# Patient Record
Sex: MALE | Race: WHITE | Employment: FULL TIME | URBAN - METROPOLITAN AREA
[De-identification: names, ages, dates, MRNs, and addresses within clinical notes are randomized per-mention and may not be internally consistent; named-entity substitution may affect disease eponyms.]

---

## 2021-01-24 ENCOUNTER — HOSPITAL ENCOUNTER (EMERGENCY)
Age: 51
Discharge: STILL ADMITTED OR ADMINISTRATIVE | End: 2021-01-24
Payer: COMMERCIAL

## 2021-01-24 ENCOUNTER — HOSPITAL ENCOUNTER (OUTPATIENT)
Age: 51
Discharge: HOME | End: 2021-01-26
Payer: COMMERCIAL

## 2021-01-24 ENCOUNTER — HOSPITAL ENCOUNTER (OUTPATIENT)
Dept: GENERAL RADIOLOGY | Age: 51
Discharge: HOME | End: 2021-01-26
Payer: COMMERCIAL

## 2021-01-24 DIAGNOSIS — M70.61 GREATER TROCHANTERIC BURSITIS, RIGHT: ICD-10-CM

## 2021-01-24 DIAGNOSIS — M25.551 RIGHT HIP PAIN: ICD-10-CM

## 2021-01-24 PROCEDURE — 73502 X-RAY EXAM HIP UNI 2-3 VIEWS: CPT

## 2021-01-29 ENCOUNTER — HOSPITAL ENCOUNTER (OUTPATIENT)
Dept: CT IMAGING | Age: 51
Discharge: HOME | End: 2021-01-31
Payer: COMMERCIAL

## 2021-01-29 DIAGNOSIS — M25.551 RIGHT HIP PAIN: ICD-10-CM

## 2021-01-29 DIAGNOSIS — M16.11 ARTHRITIS OF RIGHT HIP: ICD-10-CM

## 2021-01-29 PROCEDURE — 73700 CT LOWER EXTREMITY W/O DYE: CPT

## 2021-04-19 ASSESSMENT — PROMIS GLOBAL HEALTH SCALE
WHO IS THE PERSON COMPLETING THE PROMIS V1.1 SURVEY?: 0
SUM OF RESPONSES TO QUESTIONS 2, 4, 5, & 10: 13
TO WHAT EXTENT ARE YOU ABLE TO CARRY OUT YOUR EVERYDAY PHYSICAL ACTIVITIES SUCH AS WALKING, CLIMBING STAIRS, CARRYING GROCERIES, OR MOVING A CHAIR [ON A SCALE OF 1 (NOT AT ALL) TO 5 (COMPLETELY)]?: 3
IN GENERAL, HOW WOULD YOU RATE YOUR PHYSICAL HEALTH [ON A SCALE OF 1 (POOR) TO 5 (EXCELLENT)]?: 2
IN GENERAL, HOW WOULD YOU RATE YOUR MENTAL HEALTH, INCLUDING YOUR MOOD AND YOUR ABILITY TO THINK [ON A SCALE OF 1 (POOR) TO 5 (EXCELLENT)]?: 4

## 2021-04-19 ASSESSMENT — HOOS JR
WALKING ON UNEVEN SURFACE: 3
SITTING: 2
LYING IN BED (TURNING OVER, MAINTAINING HIP POSITION): 2
RISING FROM SITTING: 2

## 2021-04-22 PROBLEM — M16.11 PRIMARY OSTEOARTHRITIS OF RIGHT HIP: Status: ACTIVE | Noted: 2021-04-22

## 2021-04-27 NOTE — H&P
[address] [facility]                  [address]                       PREOPERATIVE HISTORY AND PHYSICAL    PATIENT NAME: Shirley Lagos                      :        1970  MED REC NO:   [id number]                            ROOM:  ACCOUNT NO:   [de-identified]                           ADMIT DATE: 2021  PROVIDER:     JADA Barrientos    DATE OF SURGERY:  2021. CHIEF COMPLAINT:  Right hip pain. HISTORY OF PRESENT ILLNESS:  This 77-year-old male with chronic right  hip pain secondary to osteoarthritis. He has failed conservative  treatments with anti-inflammatories, analgesics, and home exercises. He  is now scheduled for right total hip arthroplasty. PAST MEDICAL HISTORY:  Hypertension. PAST SURGICAL HISTORY:  Unremarkable. CURRENT MEDICATIONS:  Advil, buspirone, and benazepril. ALLERGIES:  To PENICILLIN. FAMILY HISTORY:  Diabetes. SOCIAL HISTORY:  Admits to social alcohol consumption and tobacco use. PRIMARY CARE PHYSICIAN:  [facility] .    REVIEW OF SYSTEMS:  ALLERGIES:  As stated above. SKIN AND LYMPHATICS:  Denies rashes or lesions. CNS:  No prior CVAs. RESPIRATORY:  No cough or shortness of breath. CIRCULATORY:  No prior MIs.  DIGESTIVE:  No dyspepsia. GENITOURINARY:  No dysuria. METABOLIC AND ENDOCRINE:  No thyroid disease or diabetes. HEMATOLOGIC:  No anemia. MUSCULOSKELETAL:  Positive OA. PSYCHIATRIC:  Negative. PHYSICAL EXAMINATION:  GENERAL APPEARANCE:  Well-nourished and well-developed 77-year-old male,  in no acute distress. Alert and oriented x3. SKIN:  Without masses, rashes, or lesions. LYMPH NODES:  No adenopathy. HEENT:  Head:  Normocephalic and atraumatic. Ears:  Clear and  functional.  Eyes and fundi:  PERRLA. Nose:  Clear without deviation. Mouth, teeth, and throat:  Clear and functional.  NECK:  Supple. No JVD. CHEST:  Normal excursion.   BREASTS: Deferred. LUNGS:  Clear to auscultation and percussion. HEART:  Regular rate and rhythm. No murmurs, gallops, or rubs  appreciated. ABDOMEN:  Rounded, soft, and nontender. Hernia negative. BACK:  Exam nontender. EXTREMITIES:  Without clubbing, cyanosis, or edema. Exam of right hip:   80 degrees of flexion, 0 degrees of internal rotation or external  rotation, 0 degrees of extension, and 25 degrees of abduction, all with  pain and crepitus. 2/2 pulses. Neurovascular status distally is  intact. NEUROLOGIC:  Cranial nerves II through XII grossly intact. GENITAL:  Exam deferred. RECTAL:  Exam deferred. DIAGNOSTIC IMPRESSION:  OA, right hip. PROCEDURE:  Right total hip arthroplasty.         JADA Gonzalez    D: 04/27/2021 8:19:32       T: 04/27/2021 9:39:58     [email or web address]  Job#: [id number]     Doc#: [id number]    CC:

## 2021-04-30 ENCOUNTER — HOSPITAL ENCOUNTER (OUTPATIENT)
Age: 51
Discharge: HOME | End: 2021-05-02
Payer: COMMERCIAL

## 2021-04-30 ENCOUNTER — HOSPITAL ENCOUNTER (OUTPATIENT)
Dept: PREADMISSION TESTING | Age: 51
Discharge: HOME | End: 2021-04-30
Payer: COMMERCIAL

## 2021-04-30 ENCOUNTER — ANESTHESIA EVENT (OUTPATIENT)
Dept: OPERATING ROOM | Age: 51
End: 2021-04-30
Payer: COMMERCIAL

## 2021-04-30 VITALS
TEMPERATURE: 98.5 F | HEART RATE: 72 BPM | DIASTOLIC BLOOD PRESSURE: 74 MMHG | HEIGHT: 65 IN | RESPIRATION RATE: 18 BRPM | SYSTOLIC BLOOD PRESSURE: 137 MMHG | WEIGHT: 226 LBS | OXYGEN SATURATION: 95 % | BODY MASS INDEX: 37.65 KG/M2

## 2021-04-30 DIAGNOSIS — M16.11 PRIMARY OSTEOARTHRITIS OF RIGHT HIP: ICD-10-CM

## 2021-04-30 DIAGNOSIS — Z01.818 PREOP TESTING: ICD-10-CM

## 2021-04-30 LAB — PREALBUMIN: 27 MG/DL (ref 20–40)

## 2021-04-30 PROCEDURE — U0005 INFEC AGEN DETEC AMPLI PROBE: HCPCS

## 2021-04-30 PROCEDURE — 36415 COLL VENOUS BLD VENIPUNCTURE: CPT

## 2021-04-30 PROCEDURE — 84134 ASSAY OF PREALBUMIN: CPT

## 2021-04-30 PROCEDURE — 87081 CULTURE SCREEN ONLY: CPT

## 2021-04-30 PROCEDURE — U0003 INFECTIOUS AGENT DETECTION BY NUCLEIC ACID (DNA OR RNA); SEVERE ACUTE RESPIRATORY SYNDROME CORONAVIRUS 2 (SARS-COV-2) (CORONAVIRUS DISEASE [COVID-19]), AMPLIFIED PROBE TECHNIQUE, MAKING USE OF HIGH THROUGHPUT TECHNOLOGIES AS DESCRIBED BY CMS-2020-01-R: HCPCS

## 2021-04-30 RX ORDER — BENAZEPRIL HYDROCHLORIDE 40 MG/1
40 TABLET, FILM COATED ORAL DAILY
COMMUNITY
Start: 2021-03-08

## 2021-04-30 RX ORDER — IBUPROFEN 200 MG
400 TABLET ORAL EVERY 6 HOURS PRN
Status: ON HOLD | COMMUNITY
End: 2021-05-06 | Stop reason: HOSPADM

## 2021-04-30 RX ORDER — ACETAMINOPHEN 500 MG
500 TABLET ORAL EVERY 6 HOURS PRN
Status: ON HOLD | COMMUNITY
End: 2021-05-06 | Stop reason: HOSPADM

## 2021-04-30 RX ORDER — BUSPIRONE HYDROCHLORIDE 5 MG/1
5 TABLET ORAL 2 TIMES DAILY
COMMUNITY
Start: 2021-04-01

## 2021-04-30 RX ORDER — HYDROCODONE BITARTRATE AND ACETAMINOPHEN 5; 325 MG/1; MG/1
1 TABLET ORAL EVERY 6 HOURS PRN
Status: ON HOLD | COMMUNITY
Start: 2021-04-19 | End: 2021-05-06 | Stop reason: HOSPADM

## 2021-04-30 ASSESSMENT — PAIN SCALES - GENERAL: PAINLEVEL_OUTOF10: 8

## 2021-04-30 ASSESSMENT — HOOS JR
WALKING ON UNEVEN SURFACE: 3
BENDING TO THE FLOOR TO PICK UP OBJECT: 3
RISING FROM SITTING: 3
GOING UP OR DOWN STAIRS: 2
SITTING: 1

## 2021-04-30 ASSESSMENT — PAIN DESCRIPTION - LOCATION: LOCATION: HIP

## 2021-04-30 ASSESSMENT — PAIN - FUNCTIONAL ASSESSMENT: PAIN_FUNCTIONAL_ASSESSMENT: PREVENTS OR INTERFERES WITH MANY ACTIVE NOT PASSIVE ACTIVITIES

## 2021-04-30 ASSESSMENT — PAIN DESCRIPTION - ORIENTATION: ORIENTATION: RIGHT

## 2021-04-30 ASSESSMENT — PAIN DESCRIPTION - ONSET: ONSET: ON-GOING

## 2021-04-30 NOTE — PROGRESS NOTES
[facility] PRE-ADMISSION TESTING INSTRUCTIONS    Please come to the hospital wearing a mask and have your significant other wear a mask as well. Both of you should check your temperature before leaving to come here,  if it is 100 or higher please call [phone], preop area opens at 0530 a.m.,  for instruction. The Preadmission Testing patient is instructed accordingly using the following criteria (check applicable):    ARRIVAL INSTRUCTIONS:  [x] Parking the day of Surgery is located in the Main Entrance lot. Upon entering the door, make an immediate right to the surgery reception desk    [] Bring photo ID and insurance card    [] Bring in a copy of Living will or Durable Power of  papers.     [x] Please be sure to arrange for responsible adult to provide transportation to and from the hospital    [] Please arrange for responsible adult to be with you for the 24 hour period post procedure due to having anesthesia      GENERAL INSTRUCTIONS:    [x] Nothing by mouth after midnight, including gum, candy, mints or water    [x] You may brush your teeth, but do not swallow any water    [x] Take medications as instructed with 1-2 oz of water    [x] Stop herbal supplements and vitamins 5 days prior to procedure    [x] Follow preop dosing of blood thinners per physician instructions    [] Take 1/2 dose of evening insulin, but no insulin after midnight    [] No oral diabetic medications after midnight    [] If diabetic and have low blood sugar or feel symptomatic, take 1-2oz apple juice only    [] Bring inhalers day of surgery    [x] Bring C-PAP/ Bi-Pap day of surgery    [] Bring urine specimen day of surgery    [] Shower or bath with soap, lather and rinse well, AM of Surgery, no lotion, powders or creams to surgical site    [] Follow bowel prep as instructed per surgeon    [x] No tobacco products within 24 hours of surgery     [x] No alcohol or illegal drug use within 24 hours of surgery.     [x] Jewelry, body piercing's, eyeglasses, contact lenses and dentures are not permitted into surgery (bring cases)      [] Please do not wear any nail polish, make up or hair products on the day of surgery    [x] You can expect a call the business day prior to procedure to notify you if your arrival time changes    [x] If you receive a survey after surgery we would greatly appreciate your comments    [] Parent/guardian of a minor must accompany their child and remain on the premises  the entire time they are under our care     [] Pediatric patients may bring favorite toy, blanket or comfort item with them    [] A caregiver or family member must remain with the patient during their stay if they are mentally handicapped, have dementia, disoriented or unable to use a call light or would be a safety concern if left unattended    [x] Please notify surgeon if you develop any illness between now and time of surgery (cold, cough, sore throat, fever, nausea, vomiting) or any signs of infections  including skin, wounds, and dental.    [x]  The Outpatient Pharmacy is available to fill your prescription here on your day of surgery, ask your preop nurse for details    [] Other instructions    EDUCATIONAL MATERIALS PROVIDED:    [x] PAT Preoperative Education Packet/Booklet     [x] Medication List    [] Transfusion bracelet applied with instructions    [x] Shower with soap, lather and rinse well, and use CHG wipes provided the evening before surgery as instructed    [x] Incentive spirometer with instructions

## 2021-05-01 LAB
MRSA CULTURE ONLY: NORMAL
SARS-COV-2: NOT DETECTED
SOURCE: NORMAL

## 2021-05-05 ENCOUNTER — APPOINTMENT (OUTPATIENT)
Dept: GENERAL RADIOLOGY | Age: 51
End: 2021-05-05
Attending: ORTHOPAEDIC SURGERY
Payer: COMMERCIAL

## 2021-05-05 ENCOUNTER — ANESTHESIA (OUTPATIENT)
Dept: OPERATING ROOM | Age: 51
End: 2021-05-05
Payer: COMMERCIAL

## 2021-05-05 ENCOUNTER — HOSPITAL ENCOUNTER (OUTPATIENT)
Age: 51
Setting detail: OBSERVATION
Discharge: HOME | End: 2021-05-06
Attending: ORTHOPAEDIC SURGERY | Admitting: ORTHOPAEDIC SURGERY
Payer: COMMERCIAL

## 2021-05-05 VITALS — TEMPERATURE: 97.9 F | DIASTOLIC BLOOD PRESSURE: 58 MMHG | SYSTOLIC BLOOD PRESSURE: 105 MMHG | OXYGEN SATURATION: 96 %

## 2021-05-05 DIAGNOSIS — M16.11 PRIMARY OSTEOARTHRITIS OF RIGHT HIP: Primary | ICD-10-CM

## 2021-05-05 DIAGNOSIS — Z01.818 PREOP TESTING: ICD-10-CM

## 2021-05-05 PROCEDURE — G0378 HOSPITAL OBSERVATION PER HR: HCPCS

## 2021-05-05 PROCEDURE — 7100000001 HC PACU RECOVERY - ADDTL 15 MIN: Performed by: ORTHOPAEDIC SURGERY

## 2021-05-05 PROCEDURE — 2500000003 HC RX 250 WO HCPCS: Performed by: ORTHOPAEDIC SURGERY

## 2021-05-05 PROCEDURE — 97161 PT EVAL LOW COMPLEX 20 MIN: CPT

## 2021-05-05 PROCEDURE — 2500000003 HC RX 250 WO HCPCS: Performed by: NURSE ANESTHETIST, CERTIFIED REGISTERED

## 2021-05-05 PROCEDURE — 88304 TISSUE EXAM BY PATHOLOGIST: CPT

## 2021-05-05 PROCEDURE — 2580000003 HC RX 258: Performed by: NURSE ANESTHETIST, CERTIFIED REGISTERED

## 2021-05-05 PROCEDURE — 88311 DECALCIFY TISSUE: CPT

## 2021-05-05 PROCEDURE — 2580000003 HC RX 258: Performed by: PHYSICIAN ASSISTANT

## 2021-05-05 PROCEDURE — 6370000000 HC RX 637 (ALT 250 FOR IP): Performed by: ORTHOPAEDIC SURGERY

## 2021-05-05 PROCEDURE — 97165 OT EVAL LOW COMPLEX 30 MIN: CPT

## 2021-05-05 PROCEDURE — C1776 JOINT DEVICE (IMPLANTABLE): HCPCS | Performed by: ORTHOPAEDIC SURGERY

## 2021-05-05 PROCEDURE — 2500000003 HC RX 250 WO HCPCS: Performed by: PHYSICIAN ASSISTANT

## 2021-05-05 PROCEDURE — 6360000002 HC RX W HCPCS: Performed by: NURSE ANESTHETIST, CERTIFIED REGISTERED

## 2021-05-05 PROCEDURE — 7100000000 HC PACU RECOVERY - FIRST 15 MIN: Performed by: ORTHOPAEDIC SURGERY

## 2021-05-05 PROCEDURE — 6360000002 HC RX W HCPCS: Performed by: ORTHOPAEDIC SURGERY

## 2021-05-05 PROCEDURE — 94660 CPAP INITIATION&MGMT: CPT

## 2021-05-05 PROCEDURE — 3700000001 HC ADD 15 MINUTES (ANESTHESIA): Performed by: ORTHOPAEDIC SURGERY

## 2021-05-05 PROCEDURE — 6370000000 HC RX 637 (ALT 250 FOR IP): Performed by: PHYSICIAN ASSISTANT

## 2021-05-05 PROCEDURE — 73501 X-RAY EXAM HIP UNI 1 VIEW: CPT

## 2021-05-05 PROCEDURE — 2709999900 HC NON-CHARGEABLE SUPPLY: Performed by: ORTHOPAEDIC SURGERY

## 2021-05-05 PROCEDURE — 3600000005 HC SURGERY LEVEL 5 BASE: Performed by: ORTHOPAEDIC SURGERY

## 2021-05-05 PROCEDURE — 2580000003 HC RX 258: Performed by: ORTHOPAEDIC SURGERY

## 2021-05-05 PROCEDURE — 3600000015 HC SURGERY LEVEL 5 ADDTL 15MIN: Performed by: ORTHOPAEDIC SURGERY

## 2021-05-05 PROCEDURE — 3700000000 HC ANESTHESIA ATTENDED CARE: Performed by: ORTHOPAEDIC SURGERY

## 2021-05-05 DEVICE — SHELL ACET SZ D DIA50MM 3 CLUS H TRITANIUM PRESSFIT PRI: Type: IMPLANTABLE DEVICE | Site: HIP | Status: FUNCTIONAL

## 2021-05-05 DEVICE — HEAD FEM DIA32MM +4MM OFFSET BIOLOX DELT CERAMIC V40 TAPR: Type: IMPLANTABLE DEVICE | Site: HIP | Status: FUNCTIONAL

## 2021-05-05 DEVICE — LINER ACET SZ D OD50X52MM ID32MM THK5.9MM 0DEG HIP X3: Type: IMPLANTABLE DEVICE | Site: HIP | Status: FUNCTIONAL

## 2021-05-05 DEVICE — STEM FEM SZ 4 L105MM NK L35MM 38MM OFFSET 132DEG HIP TI: Type: IMPLANTABLE DEVICE | Site: HIP | Status: FUNCTIONAL

## 2021-05-05 RX ORDER — SODIUM CHLORIDE 9 MG/ML
25 INJECTION, SOLUTION INTRAVENOUS PRN
Status: DISCONTINUED | OUTPATIENT
Start: 2021-05-05 | End: 2021-05-06 | Stop reason: HOSPADM

## 2021-05-05 RX ORDER — SODIUM CHLORIDE 0.9 % (FLUSH) 0.9 %
10 SYRINGE (ML) INJECTION PRN
Status: DISCONTINUED | OUTPATIENT
Start: 2021-05-05 | End: 2021-05-06 | Stop reason: HOSPADM

## 2021-05-05 RX ORDER — SODIUM CHLORIDE 0.9 % (FLUSH) 0.9 %
5-40 SYRINGE (ML) INJECTION EVERY 12 HOURS SCHEDULED
Status: DISCONTINUED | OUTPATIENT
Start: 2021-05-05 | End: 2021-05-05 | Stop reason: HOSPADM

## 2021-05-05 RX ORDER — EPHEDRINE SULFATE/0.9% NACL/PF 50 MG/5 ML
SYRINGE (ML) INTRAVENOUS PRN
Status: DISCONTINUED | OUTPATIENT
Start: 2021-05-05 | End: 2021-05-05 | Stop reason: SDUPTHER

## 2021-05-05 RX ORDER — ACETAMINOPHEN 500 MG
1000 TABLET ORAL EVERY 8 HOURS SCHEDULED
Status: DISCONTINUED | OUTPATIENT
Start: 2021-05-05 | End: 2021-05-06 | Stop reason: HOSPADM

## 2021-05-05 RX ORDER — MIDAZOLAM HYDROCHLORIDE 1 MG/ML
INJECTION INTRAMUSCULAR; INTRAVENOUS PRN
Status: DISCONTINUED | OUTPATIENT
Start: 2021-05-05 | End: 2021-05-05 | Stop reason: SDUPTHER

## 2021-05-05 RX ORDER — MEPERIDINE HYDROCHLORIDE 25 MG/ML
12.5 INJECTION INTRAMUSCULAR; INTRAVENOUS; SUBCUTANEOUS EVERY 5 MIN PRN
Status: DISCONTINUED | OUTPATIENT
Start: 2021-05-05 | End: 2021-05-05 | Stop reason: HOSPADM

## 2021-05-05 RX ORDER — PROPOFOL 10 MG/ML
INJECTION, EMULSION INTRAVENOUS PRN
Status: DISCONTINUED | OUTPATIENT
Start: 2021-05-05 | End: 2021-05-05 | Stop reason: SDUPTHER

## 2021-05-05 RX ORDER — CLINDAMYCIN PHOSPHATE 900 MG/50ML
900 INJECTION INTRAVENOUS
Status: COMPLETED | OUTPATIENT
Start: 2021-05-05 | End: 2021-05-05

## 2021-05-05 RX ORDER — DEXAMETHASONE SODIUM PHOSPHATE 10 MG/ML
10 INJECTION INTRAMUSCULAR; INTRAVENOUS ONCE
Status: COMPLETED | OUTPATIENT
Start: 2021-05-06 | End: 2021-05-06

## 2021-05-05 RX ORDER — SODIUM CHLORIDE 9 MG/ML
INJECTION, SOLUTION INTRAVENOUS CONTINUOUS
Status: DISCONTINUED | OUTPATIENT
Start: 2021-05-05 | End: 2021-05-06 | Stop reason: HOSPADM

## 2021-05-05 RX ORDER — SENNA AND DOCUSATE SODIUM 50; 8.6 MG/1; MG/1
1 TABLET, FILM COATED ORAL 2 TIMES DAILY
Status: DISCONTINUED | OUTPATIENT
Start: 2021-05-05 | End: 2021-05-06 | Stop reason: HOSPADM

## 2021-05-05 RX ORDER — PROMETHAZINE HYDROCHLORIDE 25 MG/1
12.5 TABLET ORAL EVERY 6 HOURS PRN
Status: DISCONTINUED | OUTPATIENT
Start: 2021-05-05 | End: 2021-05-06 | Stop reason: HOSPADM

## 2021-05-05 RX ORDER — CELECOXIB 100 MG/1
200 CAPSULE ORAL DAILY
Status: DISCONTINUED | OUTPATIENT
Start: 2021-05-06 | End: 2021-05-06

## 2021-05-05 RX ORDER — SODIUM CHLORIDE 9 MG/ML
INJECTION, SOLUTION INTRAVENOUS CONTINUOUS
Status: DISCONTINUED | OUTPATIENT
Start: 2021-05-05 | End: 2021-05-05

## 2021-05-05 RX ORDER — SODIUM CHLORIDE 9 MG/ML
INJECTION, SOLUTION INTRAVENOUS CONTINUOUS PRN
Status: DISCONTINUED | OUTPATIENT
Start: 2021-05-05 | End: 2021-05-05 | Stop reason: SDUPTHER

## 2021-05-05 RX ORDER — OXYCODONE HYDROCHLORIDE 5 MG/1
5 TABLET ORAL EVERY 4 HOURS PRN
Status: DISCONTINUED | OUTPATIENT
Start: 2021-05-05 | End: 2021-05-06 | Stop reason: HOSPADM

## 2021-05-05 RX ORDER — ONDANSETRON 2 MG/ML
INJECTION INTRAMUSCULAR; INTRAVENOUS PRN
Status: DISCONTINUED | OUTPATIENT
Start: 2021-05-05 | End: 2021-05-05 | Stop reason: SDUPTHER

## 2021-05-05 RX ORDER — SODIUM CHLORIDE 0.9 % (FLUSH) 0.9 %
5-40 SYRINGE (ML) INJECTION PRN
Status: DISCONTINUED | OUTPATIENT
Start: 2021-05-05 | End: 2021-05-05 | Stop reason: HOSPADM

## 2021-05-05 RX ORDER — PHENYLEPHRINE HYDROCHLORIDE 10 MG/ML
INJECTION INTRAVENOUS PRN
Status: DISCONTINUED | OUTPATIENT
Start: 2021-05-05 | End: 2021-05-05 | Stop reason: SDUPTHER

## 2021-05-05 RX ORDER — DEXAMETHASONE SODIUM PHOSPHATE 4 MG/ML
INJECTION, SOLUTION INTRA-ARTICULAR; INTRALESIONAL; INTRAMUSCULAR; INTRAVENOUS; SOFT TISSUE PRN
Status: DISCONTINUED | OUTPATIENT
Start: 2021-05-05 | End: 2021-05-05 | Stop reason: SDUPTHER

## 2021-05-05 RX ORDER — FENTANYL CITRATE 50 UG/ML
INJECTION, SOLUTION INTRAMUSCULAR; INTRAVENOUS PRN
Status: DISCONTINUED | OUTPATIENT
Start: 2021-05-05 | End: 2021-05-05 | Stop reason: SDUPTHER

## 2021-05-05 RX ORDER — GABAPENTIN 300 MG/1
600 CAPSULE ORAL ONCE
Status: COMPLETED | OUTPATIENT
Start: 2021-05-05 | End: 2021-05-05

## 2021-05-05 RX ORDER — LIDOCAINE HYDROCHLORIDE 20 MG/ML
INJECTION, SOLUTION EPIDURAL; INFILTRATION; INTRACAUDAL; PERINEURAL PRN
Status: DISCONTINUED | OUTPATIENT
Start: 2021-05-05 | End: 2021-05-05 | Stop reason: SDUPTHER

## 2021-05-05 RX ORDER — CELECOXIB 100 MG/1
200 CAPSULE ORAL ONCE
Status: COMPLETED | OUTPATIENT
Start: 2021-05-05 | End: 2021-05-05

## 2021-05-05 RX ORDER — GABAPENTIN 300 MG/1
300 CAPSULE ORAL NIGHTLY
Status: DISCONTINUED | OUTPATIENT
Start: 2021-05-05 | End: 2021-05-06 | Stop reason: HOSPADM

## 2021-05-05 RX ORDER — ACETAMINOPHEN 500 MG
1000 TABLET ORAL ONCE
Status: COMPLETED | OUTPATIENT
Start: 2021-05-05 | End: 2021-05-05

## 2021-05-05 RX ORDER — PROPOFOL 10 MG/ML
INJECTION, EMULSION INTRAVENOUS CONTINUOUS PRN
Status: DISCONTINUED | OUTPATIENT
Start: 2021-05-05 | End: 2021-05-05 | Stop reason: SDUPTHER

## 2021-05-05 RX ORDER — DEXAMETHASONE SODIUM PHOSPHATE 10 MG/ML
8 INJECTION, SOLUTION INTRAMUSCULAR; INTRAVENOUS ONCE
Status: DISCONTINUED | OUTPATIENT
Start: 2021-05-05 | End: 2021-05-05 | Stop reason: HOSPADM

## 2021-05-05 RX ORDER — ONDANSETRON 2 MG/ML
4 INJECTION INTRAMUSCULAR; INTRAVENOUS EVERY 6 HOURS PRN
Status: DISCONTINUED | OUTPATIENT
Start: 2021-05-05 | End: 2021-05-06 | Stop reason: HOSPADM

## 2021-05-05 RX ORDER — OXYCODONE HYDROCHLORIDE 5 MG/1
10 TABLET ORAL EVERY 4 HOURS PRN
Status: DISCONTINUED | OUTPATIENT
Start: 2021-05-05 | End: 2021-05-06 | Stop reason: HOSPADM

## 2021-05-05 RX ORDER — SODIUM CHLORIDE 9 MG/ML
25 INJECTION, SOLUTION INTRAVENOUS PRN
Status: DISCONTINUED | OUTPATIENT
Start: 2021-05-05 | End: 2021-05-05 | Stop reason: HOSPADM

## 2021-05-05 RX ORDER — SODIUM CHLORIDE 0.9 % (FLUSH) 0.9 %
10 SYRINGE (ML) INJECTION EVERY 12 HOURS SCHEDULED
Status: DISCONTINUED | OUTPATIENT
Start: 2021-05-05 | End: 2021-05-06 | Stop reason: HOSPADM

## 2021-05-05 RX ORDER — ONDANSETRON 2 MG/ML
4 INJECTION INTRAMUSCULAR; INTRAVENOUS
Status: DISCONTINUED | OUTPATIENT
Start: 2021-05-05 | End: 2021-05-05 | Stop reason: HOSPADM

## 2021-05-05 RX ORDER — CLINDAMYCIN PHOSPHATE 900 MG/50ML
900 INJECTION INTRAVENOUS EVERY 8 HOURS
Status: COMPLETED | OUTPATIENT
Start: 2021-05-05 | End: 2021-05-06

## 2021-05-05 RX ADMIN — ASPIRIN 325 MG: 325 TABLET, COATED ORAL at 12:13

## 2021-05-05 RX ADMIN — OXYCODONE HYDROCHLORIDE 5 MG: 5 TABLET ORAL at 23:22

## 2021-05-05 RX ADMIN — LIDOCAINE HYDROCHLORIDE 60 MG: 20 INJECTION, SOLUTION EPIDURAL; INFILTRATION; INTRACAUDAL; PERINEURAL at 07:14

## 2021-05-05 RX ADMIN — DEXAMETHASONE SODIUM PHOSPHATE 12 MG: 4 INJECTION, SOLUTION INTRAMUSCULAR; INTRAVENOUS at 07:14

## 2021-05-05 RX ADMIN — MIDAZOLAM 2 MG: 1 INJECTION INTRAMUSCULAR; INTRAVENOUS at 07:10

## 2021-05-05 RX ADMIN — PHENYLEPHRINE HYDROCHLORIDE 100 MCG: 10 INJECTION INTRAVENOUS at 08:34

## 2021-05-05 RX ADMIN — CLINDAMYCIN PHOSPHATE 900 MG: 900 INJECTION, SOLUTION INTRAVENOUS at 15:00

## 2021-05-05 RX ADMIN — ACETAMINOPHEN 500 MG: 500 TABLET ORAL at 06:45

## 2021-05-05 RX ADMIN — Medication 10 MG: at 08:16

## 2021-05-05 RX ADMIN — SODIUM CHLORIDE: 9 INJECTION, SOLUTION INTRAVENOUS at 06:30

## 2021-05-05 RX ADMIN — ONDANSETRON 4 MG: 2 INJECTION INTRAMUSCULAR; INTRAVENOUS at 07:18

## 2021-05-05 RX ADMIN — SODIUM CHLORIDE: 9 INJECTION, SOLUTION INTRAVENOUS at 06:00

## 2021-05-05 RX ADMIN — FENTANYL CITRATE 50 MCG: 50 INJECTION, SOLUTION INTRAMUSCULAR; INTRAVENOUS at 07:10

## 2021-05-05 RX ADMIN — PROPOFOL 50 MG: 10 INJECTION, EMULSION INTRAVENOUS at 07:14

## 2021-05-05 RX ADMIN — SODIUM CHLORIDE: 9 INJECTION, SOLUTION INTRAVENOUS at 07:58

## 2021-05-05 RX ADMIN — SODIUM CHLORIDE: 9 INJECTION, SOLUTION INTRAVENOUS at 11:46

## 2021-05-05 RX ADMIN — DOCUSATE SODIUM 50 MG AND SENNOSIDES 8.6 MG 1 TABLET: 8.6; 5 TABLET, FILM COATED ORAL at 21:35

## 2021-05-05 RX ADMIN — CLINDAMYCIN PHOSPHATE 900 MG: 900 INJECTION, SOLUTION INTRAVENOUS at 23:11

## 2021-05-05 RX ADMIN — Medication 10 MG: at 08:52

## 2021-05-05 RX ADMIN — TRANEXAMIC ACID 1 G: 1 INJECTION, SOLUTION INTRAVENOUS at 07:20

## 2021-05-05 RX ADMIN — CLINDAMYCIN IN 5 PERCENT DEXTROSE 900 MG: 18 INJECTION, SOLUTION INTRAVENOUS at 07:00

## 2021-05-05 RX ADMIN — ACETAMINOPHEN 1000 MG: 500 TABLET ORAL at 21:35

## 2021-05-05 RX ADMIN — GABAPENTIN 600 MG: 300 CAPSULE ORAL at 06:47

## 2021-05-05 RX ADMIN — CELECOXIB 200 MG: 100 CAPSULE ORAL at 06:46

## 2021-05-05 RX ADMIN — PROPOFOL 50 MCG/KG/MIN: 10 INJECTION, EMULSION INTRAVENOUS at 07:18

## 2021-05-05 RX ADMIN — TRANEXAMIC ACID 1000 MG: 1 INJECTION, SOLUTION INTRAVENOUS at 10:30

## 2021-05-05 RX ADMIN — ACETAMINOPHEN 1000 MG: 500 TABLET ORAL at 12:16

## 2021-05-05 RX ADMIN — Medication 10 MG: at 07:30

## 2021-05-05 RX ADMIN — GABAPENTIN 300 MG: 300 CAPSULE ORAL at 21:35

## 2021-05-05 RX ADMIN — OXYCODONE HYDROCHLORIDE 5 MG: 5 TABLET ORAL at 15:39

## 2021-05-05 RX ADMIN — FENTANYL CITRATE 25 MCG: 50 INJECTION, SOLUTION INTRAMUSCULAR; INTRAVENOUS at 07:30

## 2021-05-05 RX ADMIN — DOCUSATE SODIUM 50 MG AND SENNOSIDES 8.6 MG 1 TABLET: 8.6; 5 TABLET, FILM COATED ORAL at 12:15

## 2021-05-05 RX ADMIN — Medication 20 MG: at 08:34

## 2021-05-05 RX ADMIN — PHENYLEPHRINE HYDROCHLORIDE 100 MCG: 10 INJECTION INTRAVENOUS at 07:25

## 2021-05-05 RX ADMIN — PHENYLEPHRINE HYDROCHLORIDE 100 MCG: 10 INJECTION INTRAVENOUS at 07:20

## 2021-05-05 ASSESSMENT — PULMONARY FUNCTION TESTS
PIF_VALUE: 1
PIF_VALUE: 0
PIF_VALUE: 1
PIF_VALUE: 0
PIF_VALUE: 1
PIF_VALUE: 0
PIF_VALUE: 1
PIF_VALUE: 0
PIF_VALUE: 1

## 2021-05-05 ASSESSMENT — PAIN SCALES - GENERAL
PAINLEVEL_OUTOF10: 5
PAINLEVEL_OUTOF10: 0
PAINLEVEL_OUTOF10: 1
PAINLEVEL_OUTOF10: 5

## 2021-05-05 ASSESSMENT — PAIN - FUNCTIONAL ASSESSMENT: PAIN_FUNCTIONAL_ASSESSMENT: 0-10

## 2021-05-05 ASSESSMENT — LIFESTYLE VARIABLES: SMOKING_STATUS: 1

## 2021-05-05 NOTE — PROGRESS NOTES
Occupational Therapy  OCCUPATIONAL THERAPY INITIAL EVALUATION      Date:2021  Patient Name: Gita Thornton  MRN: [id number]  : 1970  Room: [address] [place], [place] [address]    Referring Provider: Neetu Wilkerson MD    Evaluating OT: Robertota Bamberger OTR/L [id number]    AM-PAC Daily Activity Raw Score: 1724    Recommended Adaptive Equipment: TBD    Diagnosis: R hip OA   Surgery: 21 R LINDSEY   Pertinent Medical History: HTN, arthritis, anxiety   Precautions:  Falls, FWBAT R LE, posterolateral hip precautions     Home Living: Pt lives alone in a 2 story home with B/B 1st floor, put a bed in living room initially for return home. Bathroom setup: walk in shower with seat and grab bar, standard commode     Prior Level of Function: Independent with ADLs, Independent with IADLs; completed functional mobility with no AD. Has [employer]. Driving: Yes. Works as a . Pain Level: mild pain R LE    Cognition: A&O: . Problem solving:  WFL   Judgement/safety:  WFL     Functional Assessment:   Initial Eval Status  Date: 21 Treatment session:  Short Term Goals     Feeding Independent     Grooming Set up  Independent   UB Dressing Set up  Independent   LB Dressing Max A  Mod I    Bathing Mod A  Mod I   Toileting Min A  Mod I   Bed Mobility  Supine to sit: Min A     Functional Transfers STS: Min A  Mod I   Functional Mobility Min A with [employer]  Household distance  1 LOB with Mod A for recovery, mild numbness present in B LEs  Mod I during ADLs   Balance Sitting: fair plus    Standing: fair minus at [employer]     Activity Tolerance Fair minus  standing bridgette x6-7 min with fair plus balance during self care tasks           ADL: Pt educated on hip precautions in relation to self care tasks with mod cues throughout for follow through. Treatment: Patient educated on techniques for completion of ADL, safe functional transfers and functional mobility.   Patient required cues for follow through with proper hand/foot placement, pacing, safety, compensatory strategies, breathing and technique in bed mobility, functional transfers, functional mobility and LB dressing in preparation for maximum independence in all self care tasks.      Hand Dominance: Right []  Left []   Strength ROM Additional Info:    RUE  4/5 WFL good  and FMC/dexterity noted during ADL tasks     LUE 4/5 WFL good  and FMC/dexterity noted during ADL tasks         Hearing: WFL   Vision: WFL   Sensation:  Mild numbness B LEs  Tone: WFL   Edema: none                             Long Term Goal (1-3 wks): Pt will maximize functional performance in all self care tasks/functional transfers with good follow through of all trained techniques for safe transition to next level of care    Assessment of current deficits   Functional mobility [x]  ADLs [x] Strength [x]  Cognition []  Functional transfers  [x] IADLs [x] Safety Awareness [x]  Endurance [x]  Fine Motor Coordination [] Balance [x] Vision/perception [] Sensation []   Gross Motor Coordination [] ROM [] Delirium []                  Motor Control []    Plan of Care: 2-5 days/week for 1-2 weeks PRN   [x]ADL retraining/adaptive techniques and AE recommendations to increase functional independence within precautions                    [x]Energy conservation techniques to improve tolerance for ADL/IADLs  [x]Functional transfer/mobility training/DME recommendations for increased independence, safety and fall prevention         [x]Patient/family education to increase safety and functional independence during daily routine          [x]Environmental modifications for safe mobility and completion of ADLs                             []Cognitive retraining to improve problem solving skills & safe participation in ADLs/IADLs     []Sensory re-education techniques to improve extremity awareness, maintain skin integrity and improve hand function                             []Visual/Perceptual retraining to improve body awareness and safety during transfers and ADLs  []Splinting/positioning needs to maintain joint/skin integrity and contracture prevention  [x]Therapeutic activity to improve functional performance during ADLs                                        [x]Therapeutic exercise to improve tolerance and functional strength for ADLs   [x]Balance retraining/tolerance tasks for facilitation of postural control with dynamic challenges during ADLs  []Neuromuscular re-education to facilitate righting/equilibrium reactions, midline orientation, scapular stability/mobility, normalize muscle tone and facilitate active functional movement                        []Delirium prevention/treatment    [x]Positioning to improve functional independence and decrease risk of skin breakdown  []Other:     Rehab Potential: Good for established goals     Patient/Family Goal: To get home. Patient and/or family were instructed on functional diagnosis, prognosis/goals and OT plan of care. Pt verbalized understanding. Upon arrival, patient supine in bed. At end of session, patient seated in hip chair with call light and phone within reach, all lines and tubes intact. Pt would benefit from continued skilled OT to increase safety and independence with completion of ADL/IADL tasks for functional independence and quality of life.      Low Evaluation 11643  Time In: 1300   Time Out: 1317      Evaluation time includes thorough review of current medical information, gathering information on past medical history/social history and prior level of function, completion of standardized testing/informal observation of tasks, assessment of data, and development of POC/Goals    Leroyvaishali Zazueta OTR/L  [id number]

## 2021-05-05 NOTE — PLAN OF CARE
Problem: Infection:  Goal: Will remain free from infection  Description: Will remain free from infection  5/5/2021 1413 by Lynne Rubio RN  Outcome: Ongoing  5/5/2021 1220 by Lynne Rubio RN  Outcome: Ongoing     Problem: Safety:  Goal: Free from accidental physical injury  Description: Free from accidental physical injury  5/5/2021 1413 by Lynne Rubio RN  Outcome: Ongoing  5/5/2021 1220 by Lynne Rubio RN  Outcome: Ongoing  Goal: Free from intentional harm  Description: Free from intentional harm  5/5/2021 1413 by Lynne Rubio RN  Outcome: Ongoing  5/5/2021 1220 by Lynne Rubio RN  Outcome: Ongoing     Problem: Daily Care:  Goal: Daily care needs are met  Description: Daily care needs are met  5/5/2021 1413 by Lynne Rubio RN  Outcome: Ongoing  5/5/2021 1220 by Lynne Rubio RN  Outcome: Ongoing     Problem: Pain:  Goal: Patient's pain/discomfort is manageable  Description: Patient's pain/discomfort is manageable  5/5/2021 1413 by Lynne Rubio RN  Outcome: Ongoing  5/5/2021 1220 by Lynne Rubio RN  Outcome: Ongoing     Problem: Skin Integrity:  Goal: Skin integrity will stabilize  Description: Skin integrity will stabilize  5/5/2021 1413 by Lynne Rubio RN  Outcome: Ongoing  5/5/2021 1220 by Lynne Rubio RN  Outcome: Ongoing     Problem: Discharge Planning:  Goal: Patients continuum of care needs are met  Description: Patients continuum of care needs are met  5/5/2021 1413 by Lynne Rubio RN  Outcome: Ongoing  5/5/2021 1220 by Lynne Rubio RN  Outcome: Ongoing  Goal: Knowledge of discharge instructions  Description: Knowledge of discharge instructions  5/5/2021 1413 by Lynne Rubio RN  Outcome: Ongoing  5/5/2021 1220 by Lynne Rubio RN  Outcome: Ongoing     Problem: Infection - Surgical Site:  Goal: Signs of wound healing will improve  Description: Signs of wound healing will improve  5/5/2021 1413 by Lynne Rubio RN  Outcome: Ongoing  5/5/2021 1220 by Antonia Amaral BROOK Fernández  Outcome: Ongoing     Problem: Mobility - Impaired:  Goal: Achieve maximum mobility level  Description: Achieve maximum mobility level  5/5/2021 1413 by Author Sohan RN  Outcome: Ongoing  5/5/2021 1220 by Author Sohan RN  Outcome: Ongoing     Problem: Pain - Acute:  Goal: Pain level will decrease  Description: Pain level will decrease  5/5/2021 1413 by Author Sohan RN  Outcome: Ongoing  5/5/2021 1220 by Author Sohan RN  Outcome: Ongoing

## 2021-05-05 NOTE — CARE COORDINATION
Met with patient about diagnosis and discharge plan of care. Met with pt in ortho class. Confirmed that he has wheeled walker, elevated toilet, grabs bars at home. No other DME needed. [facility] home care following and orders completed.  Will follow-MJO

## 2021-05-05 NOTE — ANESTHESIA POSTPROCEDURE EVALUATION
Department of Anesthesiology  Postprocedure Note    Patient: Juan Carlos Rojas  MRN: [id number]  YOB: 1970  Date of evaluation: 5/5/2021  Time:  3:40 PM     Procedure Summary     Date: 05/05/21 Room / Location: [facility] OR 04 / [facility]    Anesthesia Start: 0700 Anesthesia Stop: 7034    Procedure: RIGHT TOTAL HIP ARTHROPLASTY (Right Hip) Diagnosis: (OSTEOARTHRITIS)    Surgeons: Priscilla Mejia MD Responsible Provider: Beverly Yee MD    Anesthesia Type: spinal ASA Status: 3          Anesthesia Type: spinal    Carley Phase I: Carley Score: 10    Carley Phase II:      Last vitals: Reviewed and per EMR flowsheets.        Anesthesia Post Evaluation    Patient location during evaluation: PACU  Patient participation: complete - patient participated  Level of consciousness: awake and alert  Airway patency: patent  Nausea & Vomiting: no vomiting and no nausea  Complications: no  Cardiovascular status: hemodynamically stable  Respiratory status: acceptable  Hydration status: stable

## 2021-05-05 NOTE — PROGRESS NOTES
Physical Therapy    Facility/Department: [facility] SURGERY  Initial Assessment    NAME: Paulina Bass  : 1970  MRN: [id number]    Date of Service: 2021       REQUIRES PT FOLLOW UP: Yes       Patient Diagnosis(es): The primary encounter diagnosis was Primary osteoarthritis of right hip. A diagnosis of Preop testing was also pertinent to this visit. has a past medical history of Anxiety, Arthritis, H/O tooth extraction, Hypertension, AUDREY on CPAP, Right hip pain, and Wears glasses. has a past surgical history that includes fracture surgery (Right) and Total hip arthroplasty (Right, 2021). Evaluating Therapist: Rah Love, PT     Referring Provider:  Dr. Odilia Herrera #:  [id number]   DIAGNOSIS:  OA s/p R LINDSEY 2021   PRECAUTIONS: falls, posterolateral hip precautions, FWBAT     Social:  Pt lives alone  in a  1   floor plan  3  steps and  1  rails to enter. Prior to admission pt walked with no AD . Has ww      Initial Evaluation  Date: 2021  Treatment      Short Term/ Long Term   Goals   Was pt agreeable to Eval/treatment? yes      Does pt have pain?  minimal R hip soreness      Bed Mobility  Rolling:  NT   Supine to sit:  Min assist   Sit to supine:  NT   Scooting: SBA    SBA    Transfers Sit to stand:  Min assist   Stand to sit: CGA   Stand pivot:  NT    SBA    Ambulation    70  feet with  ww  with  Min assist    200  feet with ww  with SBA        Stair negotiation: ascended and descended NT    4  steps with  1  rail with  AAD SBA    LE ROM  decreased throughout R  hip, distally WFL      LE strength  3-/ 5 R hip, 4-/ 5 distally      AM- PAC RAW score   17/ 24            Pt is alert and Oriented x  3      Balance:  Min assist, 1 LOB with gait, fall risk   Sensation:  Mildly decreased from block     Endurance: WFL   Bed/Chair alarm:  no     ASSESSMENT  Pt displays functional ability as noted in the objective portion of this evaluation.           Treatment/Education:     Mobility as above. Instructed  in hip precautions prior to mobility. Cues for technique with bed mobility, hand and foot placement with transfers, and ww safety. Mildly ataxic gait due to decreased sensation; slow maritza     Pt educated on fall risk,  LE exercises, safe and proper technique with mobility        Patient response to education:   Pt verbalized understanding Pt demonstrated skill Pt requires further education in this area   x  with cues   x       Comments:  Pt left  In hip chair after session, with call light in reach. Rehab potential is Good for reaching above PT goals. Pts/ family goals   1. Home     Patient and or family understand(s) diagnosis, prognosis, and plan of care. -  Yes     PLAN  PT care will be provided in accordance with the objectives noted above. Whenever appropriate, clear delegation orders will be provided for nursing staff. Exercises and functional mobility practice will be used as well as appropriate assistive devices or modalities to obtain goals. Patient and family education will also be administered as needed. PLAN OF CARE:    Current Treatment Recommendations     [x] Strengthening     [] ROM   [x] Balance Training   [x] Endurance Training   [x] Transfer Training   [x] Gait Training   [x] Stair Training   [x] Positioning   [x] Safety and Education Training   [x] Patient/Caregiver Education   [x] HEP  [] Other       Frequency of treatments will be BID x 2 -3 days. Time in: 1300  Time out:  1320       Evaluation Time includes thorough review of current medical information, gathering information on past medical history/social history and prior level of function, completion of standardized testing/informal observation of tasks, assessment of data and education on plan of care and goals.     CPT codes:  [x] Low Complexity PT evaluation 89238  [] Moderate Complexity PT evaluation 42934  [] High Complexity PT evaluation 88507  [] PT Re-evaluation [id number]  [] Gait training 59631 minutes  [] Therapeutic activities 31133  minutes  [] Therapeutic exercises 65211  minutes  [] Neuromuscular reeducation 76348  minutes       Jesus 18 number:  PT 6963

## 2021-05-05 NOTE — ANESTHESIA PRE PROCEDURE
Department of Anesthesiology  Preprocedure Note       Name:  Luisa Kramer   Age:  48 y.o.  :  1970                                          MRN:  [id number]         Date:  2021      Surgeon: Tess Drummond):  Angelica Novoa MD    Procedure: Procedure(s):  RIGHT TOTAL HIP ARTHROPLASTY   +++RAKESH+++    Medications prior to admission:   Prior to Admission medications    Medication Sig Start Date End Date Taking? Authorizing Provider   benazepril (LOTENSIN) 40 MG tablet Take 40 mg by mouth daily  3/8/21  Yes Historical Provider, MD   busPIRone (BUSPAR) 5 MG tablet Take 5 mg by mouth 2 times daily  21  Yes Historical Provider, MD   HYDROcodone-acetaminophen (NORCO) 5-325 MG per tablet Take 1 tablet by mouth every 6 hours as needed.   21  Yes Historical Provider, MD   acetaminophen (TYLENOL) 500 MG tablet Take 500 mg by mouth every 6 hours as needed for Pain   Yes Historical Provider, MD   ibuprofen (ADVIL;MOTRIN) 200 MG tablet Take 400 mg by mouth every 6 hours as needed for Pain   Yes Historical Provider, MD   Multiple Vitamins-Minerals (ONE-A-DAY MENS 50+ PO) Take 1 tablet by mouth daily LD 21   Yes Historical Provider, MD       Current medications:    Current Facility-Administered Medications   Medication Dose Route Frequency Provider Last Rate Last Admin    acetaminophen (TYLENOL) tablet 1,000 mg  1,000 mg Oral Once Dirk Jumper, PA        gabapentin (NEURONTIN) capsule 600 mg  600 mg Oral Once Dirk Jumper, PA        celecoxib (CELEBREX) capsule 200 mg  200 mg Oral Once Dirk Jumper, PA        dexamethasone (PF) (DECADRON) injection 8 mg  8 mg Intravenous Once Dirk Jumper, PA        sodium chloride flush 0.9 % injection 5-40 mL  5-40 mL Intravenous 2 times per day Dirk Jumper, PA        sodium chloride flush 0.9 % injection 5-40 mL  5-40 mL Intravenous PRN Dirk Jumper, PA        0.9 % sodium chloride infusion  25 mL Intravenous PRN Dirk Jumper, PA        0.9 % sodium chloride infusion   Intravenous Continuous JADA Carlos        clindamycin (CLEOCIN) 900 mg in dextrose 5 % 50 mL IVPB  900 mg Intravenous On Call to [address], [place]        tranexamic acid (CYKLOKAPRON) 1,000 mg in dextrose 5 % 100 mL IVPB  1,000 mg Intravenous On Call to [address], [place]           Allergies: Allergies   Allergen Reactions    Penicillins Swelling     generalized       Problem List:    Patient Active Problem List   Diagnosis Code    Primary osteoarthritis of right hip M16.11       Past Medical History:        Diagnosis Date    Anxiety     Arthritis     H/O tooth extraction 03/17/2021    multiple     Hypertension     AUDREY on CPAP     Right hip [facility] 04/2021    for [facility] 05/[facility]    Wears glasses        Past Surgical History:        Procedure Laterality Date    FRACTURE SURGERY Right     ankle, plate and screws       Social History:    Social History     Tobacco Use    Smoking status: Current Every Day Smoker     Packs/day: 1.50     Years: 25.00     Pack years: 37.50     Types: Cigarettes    Smokeless tobacco: Never Used   Substance Use Topics    Alcohol use: Yes     Comment: socially beer                                Ready to quit: Not Answered  Counseling given: Not Answered      Vital Signs (Current): There were no vitals filed for this visit.                                            BP Readings from Last 3 Encounters:   04/30/21 137/74       NPO Status: Time of last liquid consumption: 2300                        Time of last solid consumption: 1800                        Date of last liquid consumption: 05/04/21                        Date of last solid food consumption: 05/04/21    BMI:   Wt Readings from Last 3 Encounters:   04/30/21 226 lb (102.5 kg)     There is no height or weight on file to calculate BMI.    CBC: No results found for: WBC, RBC, HGB, HCT, MCV, RDW, PLT    CMP: No results found for: NA, K, CL, CO2, BUN, CREATININE, GFRAA, AGRATIO,

## 2021-05-05 NOTE — OP NOTE
Operative Note      Patient: Gerson Aparicio  YOB: 1970  MRN: [id number]    Date of Procedure: 5/5/2021    Pre-Op Diagnosis: OSTEOARTHRITIS Right Hip    Post-Op Diagnosis: Same       Procedure(s):  RIGHT TOTAL HIP ARTHROPLASTY    Surgeon(s):  Deedee Brunner, MD    Assistant:   Brittni Grimm. Ramesh Sherman PA-C    Anesthesia: Spinal    Estimated Blood Loss (mL): 713 ml    Complications: None    Specimens:   ID Type Source Tests Collected by Time Destination   A : RIGHT HIP BONE Bone Bone SURGICAL PATHOLOGY Deedee Brunner, MD 5/5/2021 0800        Implants:  Implant Name Type Inv. Item Serial No.  Lot No. LRB No. Used Action   SHELL ACET SZ D [id number] CLUS H TRITANIUM PRESSFIT JALYN  SHELL ACET SZ D [id number] CLUS H TRITANIUM PRESSFIT JALYN  [employer] [facility] [id number] Right 1 Implanted   STEM FEM SZ 4 L105MM NK L35MM 38MM OFFSET 132DEG HIP TI  STEM FEM SZ 4 L105MM NK L35MM 38MM OFFSET 132DEG HIP TI  [employer] [facility] [id number] Right 1 Implanted   LINER ACET SZ D [id number] ID32MM THK5. 9MM 0DEG HIP X3  LINER ACET SZ D [id number] ID32MM THK5. 9MM 0DEG HIP X3  [employer] [facility] [id number] Right 1 Implanted   HEAD FEM [id number] +4MM OFFSET BIOLOX DELT CERAMIC V40 TAPR  HEAD FEM [id number] +4MM OFFSET BIOLOX DELT CERAMIC V40 TAPR  [employer] [facility] [id number] Right 1 Implanted         Drains: * No LDAs found *    Findings: OA    Detailed Description of Procedure:       HISTORY:  This patient is a 48 y.o.  male  with progressive pain in the right hip. X-rays and evaluation revealed significant osteoarthritis of the right hip, bone-on-bone in the weightbearing aspect, with significant peripheral osteophytes. The patient had tried or considered all conservative options applicable, including  anti-inflammatories, weight loss and exercise, without success. Having failed conservative options, it was felt the patient would benefit from a total hip arthroplasty.   The patient was cleared medically, proximal filling. We applied our trial 132 degree neck and a 32+0 head and reduced the hip. There was soft tissue laxity, so we went up to a 32 + 4 mm head trial.  This gave us excellent soft tissue tension. We had excellent stability with flexion to 90 degrees and internal rotation to about 90 degrees. We were also stable in extension and external rotation, as well as mid flexion, adduction, and internal rotation. We therefore, chose this combination of implants. We removed all the trials. We inserted the actual X-3 poly size D, 32 mm for the 50 mm Trident II Tritanium cup. We then implanted the actual Stefany Accolade 2 femoral component, size 4, 132 degree neck which was fully seated with good purchase. We then did a final trial reduction with a 32+4 mm head with excellent stability and soft tissue tension as previously described. We applied the actual femoral head component and reduced the hip. Then we copiously irrigated the wound. We closed the capsule posteriorly with #1 Ethibond figure of eight suture. We then repaired the short external rotators back to the posterior margins of the greater trochanter with drill holes through bone. We closed the tensor fascia with #1 Stratofix running suture. We closed the subcu layer with 2-0 Vicryl inverted suture, and the skin with a running 3-0 Monocryl subcuticular stitch. Steri-strips were applied to the skin. Sterile dressing was applied with Aquacel. The patient was placed in an abductor pillow and turned supine on the hospital bed. The patient was retrieved from anesthesia and taken to recovery room in good condition, having tolerated the procedure well. All needle, sponge and instrument counts were correct. SUMMARY OF IMPLANTS:  We used Stefany implants with 50 mm Trident II Tritanium acetabular component with size D, 32 mm X-3 polyethylene insert.   We used [place] Accolade 2 femoral component size 4, 132 degree neck with a MightyQuiz ceramic 32 + 4 mm head. SPECIMEN:  Femoral head      Jung Chandler PA-C, was present for the entire procedure, assisting in   positioning & draping, each step of the procedure, and wound closure.       Suzy Allen           Electronically signed by Suzy Allen MD on 5/5/2021 at 8:40 AM

## 2021-05-06 VITALS
SYSTOLIC BLOOD PRESSURE: 131 MMHG | WEIGHT: 227.2 LBS | TEMPERATURE: 98.3 F | DIASTOLIC BLOOD PRESSURE: 76 MMHG | RESPIRATION RATE: 16 BRPM | OXYGEN SATURATION: 95 % | HEIGHT: 65 IN | HEART RATE: 72 BPM | BODY MASS INDEX: 37.85 KG/M2

## 2021-05-06 PROBLEM — E66.01 CLASS 2 SEVERE OBESITY WITH SERIOUS COMORBIDITY IN ADULT (HCC): Status: ACTIVE | Noted: 2021-05-06

## 2021-05-06 PROBLEM — R73.9 ELEVATED BLOOD SUGAR: Status: ACTIVE | Noted: 2021-05-06

## 2021-05-06 LAB
ALBUMIN SERPL-MCNC: 3.4 G/DL (ref 3.5–5.2)
ALP BLD-CCNC: 42 U/L (ref 40–129)
ALT SERPL-CCNC: 20 U/L (ref 0–40)
ANION GAP SERPL CALCULATED.3IONS-SCNC: 9 MMOL/L (ref 7–16)
AST SERPL-CCNC: 28 U/L (ref 0–39)
BASOPHILS ABSOLUTE: 0.01 E9/L (ref 0–0.2)
BASOPHILS RELATIVE PERCENT: 0.1 % (ref 0–2)
BILIRUB SERPL-MCNC: 0.2 MG/DL (ref 0–1.2)
BUN BLDV-MCNC: 16 MG/DL (ref 6–20)
CALCIUM SERPL-MCNC: 8.1 MG/DL (ref 8.6–10.2)
CHLORIDE BLD-SCNC: 106 MMOL/L (ref 98–107)
CO2: 19 MMOL/L (ref 22–29)
CREAT SERPL-MCNC: 0.7 MG/DL (ref 0.7–1.2)
EOSINOPHILS ABSOLUTE: 0 E9/L (ref 0.05–0.5)
EOSINOPHILS RELATIVE PERCENT: 0 % (ref 0–6)
GFR AFRICAN AMERICAN: >60
GFR NON-AFRICAN AMERICAN: >60 ML/MIN/1.73
GLUCOSE BLD-MCNC: 143 MG/DL (ref 74–99)
HCT VFR BLD CALC: 34.8 % (ref 37–54)
HCT VFR BLD CALC: 34.9 % (ref 37–54)
HEMOGLOBIN: 11.9 G/DL (ref 12.5–16.5)
HEMOGLOBIN: 12 G/DL (ref 12.5–16.5)
IMMATURE GRANULOCYTES #: 0.1 E9/L
IMMATURE GRANULOCYTES %: 0.7 % (ref 0–5)
LYMPHOCYTES ABSOLUTE: 1.1 E9/L (ref 1.5–4)
LYMPHOCYTES RELATIVE PERCENT: 7.3 % (ref 20–42)
MCH RBC QN AUTO: 31.7 PG (ref 26–35)
MCHC RBC AUTO-ENTMCNC: 34.4 % (ref 32–34.5)
MCV RBC AUTO: 92.1 FL (ref 80–99.9)
MONOCYTES ABSOLUTE: 1.33 E9/L (ref 0.1–0.95)
MONOCYTES RELATIVE PERCENT: 8.8 % (ref 2–12)
NEUTROPHILS ABSOLUTE: 12.61 E9/L (ref 1.8–7.3)
NEUTROPHILS RELATIVE PERCENT: 83.1 % (ref 43–80)
PDW BLD-RTO: 11.8 FL (ref 11.5–15)
PLATELET # BLD: 232 E9/L (ref 130–450)
PMV BLD AUTO: 9.7 FL (ref 7–12)
POTASSIUM SERPL-SCNC: 4.1 MMOL/L (ref 3.5–5)
RBC # BLD: 3.79 E12/L (ref 3.8–5.8)
SODIUM BLD-SCNC: 134 MMOL/L (ref 132–146)
TOTAL PROTEIN: 5.3 G/DL (ref 6.4–8.3)
WBC # BLD: 15.2 E9/L (ref 4.5–11.5)

## 2021-05-06 PROCEDURE — 97530 THERAPEUTIC ACTIVITIES: CPT

## 2021-05-06 PROCEDURE — 2580000003 HC RX 258: Performed by: ORTHOPAEDIC SURGERY

## 2021-05-06 PROCEDURE — 85025 COMPLETE CBC W/AUTO DIFF WBC: CPT

## 2021-05-06 PROCEDURE — 96374 THER/PROPH/DIAG INJ IV PUSH: CPT

## 2021-05-06 PROCEDURE — 94660 CPAP INITIATION&MGMT: CPT

## 2021-05-06 PROCEDURE — 6360000002 HC RX W HCPCS: Performed by: ORTHOPAEDIC SURGERY

## 2021-05-06 PROCEDURE — 85014 HEMATOCRIT: CPT

## 2021-05-06 PROCEDURE — 36415 COLL VENOUS BLD VENIPUNCTURE: CPT

## 2021-05-06 PROCEDURE — 80053 COMPREHEN METABOLIC PANEL: CPT

## 2021-05-06 PROCEDURE — G0378 HOSPITAL OBSERVATION PER HR: HCPCS

## 2021-05-06 PROCEDURE — 85018 HEMOGLOBIN: CPT

## 2021-05-06 PROCEDURE — 6370000000 HC RX 637 (ALT 250 FOR IP): Performed by: ORTHOPAEDIC SURGERY

## 2021-05-06 PROCEDURE — 97535 SELF CARE MNGMENT TRAINING: CPT

## 2021-05-06 RX ORDER — OXYCODONE HYDROCHLORIDE 5 MG/1
5-10 TABLET ORAL EVERY 4 HOURS PRN
Qty: 70 TABLET | Refills: 0 | Status: SHIPPED | OUTPATIENT
Start: 2021-05-06 | End: 2021-05-13

## 2021-05-06 RX ORDER — INDOMETHACIN 75 MG/1
75 CAPSULE, EXTENDED RELEASE ORAL
Status: DISCONTINUED | OUTPATIENT
Start: 2021-05-06 | End: 2021-05-06 | Stop reason: HOSPADM

## 2021-05-06 RX ORDER — INDOMETHACIN 75 MG/1
75 CAPSULE, EXTENDED RELEASE ORAL
Qty: 60 CAPSULE | Refills: 3 | Status: SHIPPED | OUTPATIENT
Start: 2021-05-06 | End: 2021-05-27

## 2021-05-06 RX ORDER — ACETAMINOPHEN 500 MG
1000 TABLET ORAL EVERY 8 HOURS
Qty: 180 TABLET | Refills: 0 | Status: SHIPPED | OUTPATIENT
Start: 2021-05-06

## 2021-05-06 RX ORDER — ASPIRIN 325 MG
325 TABLET, DELAYED RELEASE (ENTERIC COATED) ORAL DAILY
Qty: 28 TABLET | Refills: 0 | Status: SHIPPED | OUTPATIENT
Start: 2021-05-06 | End: 2021-06-03

## 2021-05-06 RX ADMIN — ACETAMINOPHEN 1000 MG: 500 TABLET ORAL at 06:41

## 2021-05-06 RX ADMIN — ASPIRIN 325 MG: 325 TABLET, COATED ORAL at 10:08

## 2021-05-06 RX ADMIN — OXYCODONE HYDROCHLORIDE 10 MG: 5 TABLET ORAL at 14:27

## 2021-05-06 RX ADMIN — INDOMETHACIN 75 MG: 75 CAPSULE, EXTENDED RELEASE ORAL at 09:00

## 2021-05-06 RX ADMIN — DEXAMETHASONE SODIUM PHOSPHATE 10 MG: 10 INJECTION INTRAMUSCULAR; INTRAVENOUS at 06:41

## 2021-05-06 RX ADMIN — DOCUSATE SODIUM 50 MG AND SENNOSIDES 8.6 MG 1 TABLET: 8.6; 5 TABLET, FILM COATED ORAL at 10:08

## 2021-05-06 RX ADMIN — SODIUM CHLORIDE, PRESERVATIVE FREE 10 ML: 5 INJECTION INTRAVENOUS at 10:10

## 2021-05-06 RX ADMIN — OXYCODONE HYDROCHLORIDE 5 MG: 5 TABLET ORAL at 06:41

## 2021-05-06 ASSESSMENT — PAIN SCALES - GENERAL
PAINLEVEL_OUTOF10: 2
PAINLEVEL_OUTOF10: 5

## 2021-05-06 NOTE — PROGRESS NOTES
Physical Therapy    Facility/Department: [address] ORTHO SURGERY  Treatment note    NAME: Mireille Alvarado  : 1970  MRN: [id number]    Date of Service: 2021               Patient Diagnosis(es): The primary encounter diagnosis was Primary osteoarthritis of right hip. A diagnosis of Preop testing was also pertinent to this visit. has a past medical history of Anxiety, Arthritis, H/O tooth extraction, Hypertension, AUDREY on CPAP, Right hip pain, and Wears glasses. has a past surgical history that includes fracture surgery (Right) and Total hip arthroplasty (Right, 2021). Evaluating Therapist: Chaitanya Plasencia, PT     Referring Provider:  Dr. Aura Rudd #:  [id number]   DIAGNOSIS:  OA s/p R LINDSEY 2021   PRECAUTIONS: falls, posterolateral hip precautions, FWBAT     Social:  Pt lives alone  in a  1   floor plan  3  steps and  1  rails to enter. Prior to admission pt walked with no AD . Has ww      Initial Evaluation  Date: 2021  Treatment  21    Short Term/ Long Term   Goals   Was pt agreeable to Eval/treatment?   yes  yes    Does pt have pain?  minimal R hip soreness  R hip sorenss    Bed Mobility  Rolling:  NT   Supine to sit:  Min assist   Sit to supine:  NT   Scooting: SBA  NT  SBA    Transfers Sit to stand:  Min assist   Stand to sit: CGA   Stand pivot:  NT  Sit <> stand: SBA  SBA    Ambulation    70  feet with  ww  with  Min assist   220 feet and 120 feet x 1 each using WW for support SBA for balance 200  feet with ww  with SBA        Stair negotiation: ascended and descended NT  4 stairs with B rails, 4 stairs with rail/standard cane for support SBA, step to pattern used  4  steps with  1  rail with  AAD SBA    LE ROM  decreased throughout R  hip, distally WFL      LE strength  3-/ 5 R hip, 4-/ 5 distally      AM- PAC RAW score             Pt is alert and able to follow instruction  Balance: fair dynamic using [employer] for support    Pt performed therapeutic exercise of the following:

## 2021-05-06 NOTE — PROGRESS NOTES
Physical Therapy    Facility/Department: [address] ORTHO SURGERY  Treatment note    NAME: Francesco Pitts  : 1970  MRN: [id number]    Date of Service: 2021               Patient Diagnosis(es): The primary encounter diagnosis was Primary osteoarthritis of right hip. A diagnosis of Preop testing was also pertinent to this visit. has a past medical history of Anxiety, Arthritis, H/O tooth extraction, Hypertension, AUDREY on CPAP, Right hip pain, and Wears glasses. has a past surgical history that includes fracture surgery (Right) and Total hip arthroplasty (Right, 2021). Evaluating Therapist: Brenden Dale PT     Referring Provider:  Dr. Jain Pro #:  [id number]   DIAGNOSIS:  OA s/p R LINDSEY 2021   PRECAUTIONS: falls, posterolateral hip precautions, FWBAT     Social:  Pt lives alone  in a  1   floor plan  3  steps and  1  rails to enter. Prior to admission pt walked with no AD . Has ww      Initial Evaluation  Date: 2021  Treatment  21 PM   Short Term/ Long Term   Goals   Was pt agreeable to Eval/treatment?   yes  yes    Does pt have pain?  minimal R hip soreness  R hip sorenss    Bed Mobility  Rolling:  NT   Supine to sit:  Min assist   Sit to supine:  NT   Scooting: SBA  Supine<> Sit SBA  SBA    Transfers Sit to stand:  Min assist   Stand to sit: CGA   Stand pivot:  NT  Sit <> stand: SBA  SBA    Ambulation    70  feet with  ww  with  Min assist   390 feet and 120 feet x 1 each using WW for support SBA for balance 200  feet with ww  with SBA        Stair negotiation: ascended and descended NT  4 stairs with rail/standard cane for support SBA, step to pattern used  4  steps with  1  rail with  AAD SBA    LE ROM  decreased throughout R  hip, distally WFL      LE strength  3-/ 5 R hip, 4-/ 5 distally      AM- PAC RAW score             Pt is alert and able to follow instruction  Balance: fair dynamic using [employer] for support    Pt performed therapeutic exercise of the following: seated ankle pumps, glut sets x 20 AROM    Patient education  Pt was educated on exercise promoting circulation and strengthening, UE usage to assist with transfers, gait promoting posture, sequence, staying within [employer] base of support, stair negotiation promoting sequence and cane placement, hip precautions    Patient response to education:   Pt verbalized understanding Pt demonstrated skill Pt requires further education in this area   yes With instruction yes     ASSESSMENT:   Comments: Pt found in a bedside chair, agreed to Rx. Gait to the hallway, maritza slow and consistent, step through reciprocal pattern achieved after instruction, no loss of balance or shortness of breath noted. Stairs performed with minimal difficulty. Once back to the room, car transfer discussed, bed mobility performed using sheet for support. Girlfriend present all rx. Pt states she will stay with him initially at home. Pt and Girlfriend states has no further questions about home safety. Treatment: Pt practiced and was instructed in the following treatment: exercise, gait mechanics, [employer] safety, stairs, hip precautions, bed mobility, car transfer    Pt was left in a bedside hip chair as found with call light in reach    Time in 1312  Time out 1337   Total Treatment Time 25 minutes   CPT codes:     Therapeutic activities 97338 25 minutes   Therapeutic exercises 23366 0 minutes       Pt is making good progress toward established Physical Therapy goals as per increased functional mobility performed. Continue with physical therapy current plan of care.     Elicia Akers PTA   License Number: [id number]

## 2021-05-06 NOTE — PLAN OF CARE
Problem: Infection:  Goal: Will remain free from infection  Description: Will remain free from infection  Outcome: Met This Shift     Problem: Safety:  Goal: Free from accidental physical injury  Description: Free from accidental physical injury  Outcome: Met This Shift  Goal: Free from intentional harm  Description: Free from intentional harm  Outcome: Met This Shift     Problem: Daily Care:  Goal: Daily care needs are met  Description: Daily care needs are met  Outcome: Met This Shift     Problem: Pain:  Goal: Patient's pain/discomfort is manageable  Description: Patient's pain/discomfort is manageable  Outcome: Met This Shift     Problem: Skin Integrity:  Goal: Skin integrity will stabilize  Description: Skin integrity will stabilize  Outcome: Met This Shift     Problem: Discharge Planning:  Goal: Patients continuum of care needs are met  Description: Patients continuum of care needs are met  5/6/2021 0957 by Eldonna Cushing, RN  Outcome: Met This Shift  5/5/2021 2235 by Drew Hamilton RN  Outcome: Ongoing  Goal: Knowledge of discharge instructions  Description: Knowledge of discharge instructions  5/6/2021 0957 by Eldonna Cushing, RN  Outcome: Met This Shift  5/5/2021 2235 by Drew Hamilton RN  Outcome: Ongoing     Problem: Infection - Surgical Site:  Goal: Signs of wound healing will improve  Description: Signs of wound healing will improve  5/6/2021 0957 by Eldonna Cushing, RN  Outcome: Met This Shift  5/5/2021 2235 by Drew Hamilton RN  Outcome: Ongoing     Problem: Mobility - Impaired:  Goal: Achieve maximum mobility level  Description: Achieve maximum mobility level  Outcome: Met This Shift     Problem: Pain - Acute:  Goal: Pain level will decrease  Description: Pain level will decrease  5/6/2021 0957 by Eldonna Cushing, RN  Outcome: Met This Shift  5/5/2021 2235 by Drew Hamilton RN  Outcome: Ongoing     Problem: Falls - Risk of:  Goal: Will remain free from falls  Description: Will remain free from falls  Outcome: Met This Shift  Goal: Absence of physical injury  Description: Absence of physical injury  Outcome: Met This Shift

## 2021-05-06 NOTE — PROGRESS NOTES
Occupational Therapy  OT BEDSIDE TREATMENT NOTE      Date:2021  Patient Name: Jerica Huitron  MRN: [id number]  : 1970  Room: [address] [place] [address]     Referring Provider: Paulina Mooney MD     Evaluating OT: Jeffy Benton OTR/L [id number]     AM-PAC Daily Activity Raw Score:      Recommended Adaptive Equipment: elevated toilet seat     Diagnosis: R hip OA   Surgery: 21 R LINDSEY   Pertinent Medical History: HTN, arthritis, anxiety   Precautions:  Falls, FWBAT R LE, posterolateral hip precautions     Home Living: Pt lives alone in a 2 story home with B/B 1st floor, put a bed in living room initially for return home. Bathroom setup: walk in shower with seat and grab bar, standard commode      Prior Level of Function: Independent with ADLs, Independent with IADLs; completed functional mobility with no AD. Has [address] Tone.     Pain Level: pt did not report level of pain.      Cognition: Awake and alert. Cues for safety awareness. Functional Assessment:    Initial Eval Status  Date: 21 Treatment session:  Short Term Goals      Feeding Independent       Grooming Set up Independent   Independent   UB Dressing Set up  setup  Independent   LB Dressing Max A  pt instructed with posterolateral hip precautions. Pt with limited carry over of restrictions. Limited comprehension of importance of not bending past 90 degrees for ADL process. Pt instructed with adaptive equipment use and hip kit issued to pt this session. Mod I    Bathing Mod A Bathe sponge issued. Walk in shower transfer SBA after instruction of technique.    Mod I   Toileting Min A Pt states he has elevated toilet seat at home. Recommended use of elevated seat due to hip precautions.   Instructed not to sit on low surfaces.   Mod I   Bed Mobility  Supine to sit: Min A       Functional Transfers STS: Min A Independent.   Mod I   Functional Mobility Min A with [address] Tone  Household distance  1 LOB with Mod A for recovery, mild numbness present in B LEs Supervision using w/w   Mod I during ADLs   Balance Sitting: fair plus     Standing: fair minus at [address] Tone       Activity Tolerance Fair minus Fair +   standing bridgette x6-7 min with fair plus balance during self care tasks       Comments:  Pt very concerned about getting back to work. Questionable understanding of importance of hip precautions during ADL. Pt provided with AE. No further questions regarding AE use or dressing technique. Pt remained in chair at end of the session. Education/treatment:  ADL retraining with facilitation of movement, instruction of adaptive equipment, and education of posterolateral hip precautions for self care skills. Therapeutic activity to address balance and endurance for ADL and transfers. Pt education of transfer safety, walker safety, home safety, hip precautions, and adaptive equipment use. · Pt has made  progress towards set goals. Plan of Care: 2-5 days/week for 1-2 weeks PRN   [x]? ?ADL retraining/adaptive techniques and AE recommendations to increase functional independence within precautions                    [x]? ? Energy conservation techniques to improve tolerance for ADL/IADLs  [x]? ? Functional transfer/mobility training/DME recommendations for increased independence, safety and fall prevention         [x]? ?Patient/family education to increase safety and functional independence during daily routine          [x]? ? Environmental modifications for safe mobility and completion of ADLs                             []? ? Cognitive retraining to improve problem solving skills & safe participation in ADLs/IADLs     []? ?Sensory re-education techniques to improve extremity awareness, maintain skin integrity and improve hand function                             []? ? Visual/Perceptual retraining to improve body awareness and safety during transfers and ADLs  []? ? Splinting/positioning needs to maintain joint/skin integrity and contracture prevention  [x]? ? Therapeutic activity to improve functional performance during ADLs                                        [x]? ? Therapeutic exercise to improve tolerance and functional strength for ADLs   [x]? ? Balance retraining/tolerance tasks for facilitation of postural control with dynamic challenges during ADLs  []? ?Neuromuscular re-education to facilitate righting/equilibrium reactions, midline orientation, scapular stability/mobility, normalize muscle tone and facilitate active functional movement                        []? ? Delirium prevention/treatment    [x]? Positioning to improve functional independence and decrease risk of skin breakdown  []? ?Other:     Time In: 9:12  Time Out: 9:40      Min Units   Therapeutic Ex 39801     Therapeutic Activities 19211 78 1   ADL/Self Care 49342 28 2   Orthotic Management 05315     Neuro Re-Ed 46939     Non-Billable Time     TOTAL TIMED TREATMENT 38 1239 [facility] CLARA/L [id number]

## 2021-05-06 NOTE — CARE COORDINATION
Updated discharge plan of care. POD#2. Discharge home today with no needs.  [facility] notified-dmitry

## 2021-05-06 NOTE — CONSULTS
[address] [facility]  Initial Consult Note      REASON FOR CONSULTATION: Medical management    ATTENDING/ADMITTING PHYSICIAN: Dr. Violet Ramires:      The patient is a 48 y.o. male patient of Dr. Owen Cap history of hypertension, AUDREY, obesity who presents with right hip OA status post right LINDSEY 5/5. Pt denies uncontrolled pain. No chest pain, trouble breathing, vomiting, or diarrhea. No fevers or chills. Patient states  chronic medical problems are well controlled. Patient denies history of diabetes. He does acknowledge that he has significant weight problems. He states that he hopes to increase his activity after knee replacement. He has sleep apnea and uses CPAP regularly        Past Medical History:   Diagnosis Date    Anxiety     Arthritis     H/O tooth extraction 03/17/2021    multiple     Hypertension     AUDREY on CPAP     Right hip [facility] 04/2021    [facility]    Wears glasses            Past Surgical History:   Procedure Laterality Date    FRACTURE SURGERY Right     ankle, plate and screws    TOTAL HIP ARTHROPLASTY Right 5/5/2021    RIGHT TOTAL HIP ARTHROPLASTY performed by Kevin Freed MD at [facility] OR       Medications Prior to Admission:    Medications Prior to Admission: benazepril (LOTENSIN) 40 MG tablet, Take 40 mg by mouth daily   busPIRone (BUSPAR) 5 MG tablet, Take 5 mg by mouth 2 times daily   Multiple Vitamins-Minerals (ONE-A-DAY MENS 50+ PO), Take 1 tablet by mouth daily LD 04/30/21  [DISCONTINUED] HYDROcodone-acetaminophen (NORCO) 5-325 MG per tablet, Take 1 tablet by mouth every 6 hours as needed.    [DISCONTINUED] acetaminophen (TYLENOL) 500 MG tablet, Take 500 mg by mouth every 6 hours as needed for Pain  [DISCONTINUED] ibuprofen (ADVIL;MOTRIN) 200 MG tablet, Take 400 mg by mouth every 6 hours as needed for Pain    Note that the patient's home medications were reviewed and the above list is accurate to the best of my knowledge at the time of the exam.      Allergies:    Penicillins    Social History:    reports that he has been smoking cigarettes. He has a 37.50 pack-year smoking history. He has never used smokeless tobacco. He reports current alcohol use. He reports previous drug use. Family History:   pt denies contributory history     REVIEW OF SYSTEMS:  As above in the HPI, otherwise negative    PHYSICAL EXAM:    Vitals:  /77   Pulse 82   Temp 98 °F (36.7 °C) (Oral)   Resp 16   Ht 5' 5\" (1.651 m)   Wt 227 lb 3.2 oz (103.1 kg)   SpO2 95%   BMI 37.81 kg/m²     General:  Awake, alert, oriented X 3. Well developed, well nourished, well groomed. No apparent distress. HEENT:  Normocephalic, atraumatic. Pupils equal, round, reactive to light. No scleral icterus. No conjunctival injection. Normal lips, teeth, and gums. No nasal discharge. Neck:  Supple  Heart:  RRR, no murmurs, gallops, rubs  Lungs:  CTA bilaterally, bilat symmetrical expansion, no wheeze, rales, or rhonchi  Abdomen:   Bowel sounds present, soft, nontender, no masses, no organomegaly, no peritoneal signs  Extremities: Incision CDI no clubbing, cyanosis, or edema  Skin:  Warm and dry, no open lesions or rash  Neuro:  Cranial nerves 2-12 intact, no focal deficits  Breast: deferred  Rectal: deferred  Genitalia:  deferred    LABS:    CBC with Differential:    Lab Results   Component Value Date    WBC 15.2 05/06/2021    RBC 3.79 05/06/2021    HGB 12.0 05/06/2021    HGB 11.9 05/06/2021    HCT 34.9 05/06/2021    HCT 34.8 05/06/2021     05/06/2021    MCV 92.1 05/06/2021    MCH 31.7 05/06/2021    MCHC 34.4 05/06/2021    RDW 11.8 05/06/2021    LYMPHOPCT 7.3 05/06/2021    MONOPCT 8.8 05/06/2021    BASOPCT 0.1 05/06/2021    MONOSABS 1.33 05/06/2021    LYMPHSABS 1.10 05/06/2021    EOSABS 0.00 05/06/2021    BASOSABS 0.01 05/06/2021     CMP:    Lab Results   Component Value Date     05/06/2021    K 4.1 05/06/2021     05/06/2021    CO2 19 05/06/2021 BUN 16 05/06/2021    CREATININE 0.7 05/06/2021    GFRAA >60 05/06/2021    LABGLOM >60 05/06/2021    GLUCOSE 143 05/06/2021    PROT 5.3 05/06/2021    LABALBU 3.4 05/06/2021    CALCIUM 8.1 05/06/2021    BILITOT 0.2 05/06/2021    ALKPHOS 42 05/06/2021    AST 28 05/06/2021    ALT 20 05/06/2021     Magnesium:  No results found for: MG  Phosphorus:  No results found for: PHOS  PT/INR:  No results found for: PROTIME, INR  U/A:  No results found for: NITRITE, COLORU, PROTEINU, PHUR, LABCAST, WBCUA, RBCUA, MUCUS, TRICHOMONAS, YEAST, BACTERIA, CLARITYU, SPECGRAV, LEUKOCYTESUR, UROBILINOGEN, BILIRUBINUR, BLOODU, GLUCOSEU, AMORPHOUS  HgBA1c:  No results found for: LABA1C  FLP:  No results found for: TRIG, HDL, LDLCALC, LDLDIRECT, LABVLDL  TSH:  No results found for: TSH    ASSESSMENT:      Principal Problem:    Primary osteoarthritis of right hip  Active Problems:    Hypertension    AUDREY on CPAP    Class 2 severe obesity with serious comorbidity in adult ([address]    Elevated blood sugar  Resolved Problems:    * No resolved hospital problems. *      PLAN:    55-year-old obese male status post right LINDSEY    Perioperative care per primary service  Pain control  Bowel regimen  PT/OT  Elevated blood sugar, labs this morning-may be secondary to steroids received yesterday  Recommend follow-up with PCP regarding blood sugar after discharge  Blood pressure controlled-home ACE inhibitor held   Continue nocturnal NIPPV  Counselled weight loss through healthy diet and increased activity   medications for other co morbidities cont as appropriate w dosage adjustments as necessary     Thank you for allowing me to participate in the care of this patient. Okay for discharge from my perspective. Recommend close follow-up with PCP regarding chronic health issues.         Wilfred Beach MD  8:00 AM  5/6/2021

## 2021-05-06 NOTE — PROGRESS NOTES
Total Joint    Post op Day  1      S:  Complaints: none    O:  Afebrile, Vital signs stable     Dressing Intact   Full range of motion right ankle and toes   Sensation intact to light touch     H/H:   Recent Labs     05/06/21  0413   HGB 11.9*  12.0*   HCT 34.8*  34.9*        PT/INR:   Recent Labs     05/06/21  0413   PROT 5.3*                   Xray:  stable implants    A/P:  Stable   Acute post-op blood loss anemia-stable   Proceed with Physical Therapy                            Heplock IV's if PO intake is adequate              Start Indocin to prevent heterotopic ossification   Evaluate for Home Discharge. Can go today. Home health care needed secondary to unsteady gait, walker for ambulation,                      and need for home physical therapy.

## 2021-05-06 NOTE — PLAN OF CARE
Problem: Discharge Planning:  Goal: Patients continuum of care needs are met  Description: Patients continuum of care needs are met  5/5/2021 2235 by Krystal Block RN  Outcome: Ongoing     Problem: Discharge Planning:  Goal: Knowledge of discharge instructions  Description: Knowledge of discharge instructions  5/5/2021 2235 by Krystal Block RN  Outcome: Ongoing     Problem: Infection - Surgical Site:  Goal: Signs of wound healing will improve  Description: Signs of wound healing will improve  5/5/2021 2235 by Krystal Block RN  Outcome: Ongoing     Problem: Pain - Acute:  Goal: Pain level will decrease  Description: Pain level will decrease  5/5/2021 2235 by Krystal Block RN  Outcome: Ongoing

## 2021-11-17 ENCOUNTER — HOSPITAL ENCOUNTER (OUTPATIENT)
Age: 51
Discharge: HOME | End: 2021-11-19
Payer: COMMERCIAL

## 2021-11-17 ENCOUNTER — HOSPITAL ENCOUNTER (OUTPATIENT)
Dept: GENERAL RADIOLOGY | Age: 51
Discharge: HOME | End: 2021-11-19
Payer: COMMERCIAL

## 2021-11-17 DIAGNOSIS — M25.552 LEFT HIP PAIN: ICD-10-CM

## 2021-11-17 PROCEDURE — 73502 X-RAY EXAM HIP UNI 2-3 VIEWS: CPT

## 2022-07-19 PROBLEM — M16.12 PRIMARY OSTEOARTHRITIS OF LEFT HIP: Status: ACTIVE | Noted: 2022-07-19

## 2022-07-21 NOTE — H&P
[address] [facility]                  [address]                       PREOPERATIVE HISTORY AND PHYSICAL    PATIENT NAME: Savi Hayes                    :        1970  MED REC NO:   [id number]                            ROOM:  ACCOUNT NO:   [de-identified]                           ADMIT DATE: 2022  PROVIDER:     Danilo Guo PA-C    DATE OF SERVICE:  2022. Dictating for Burtis Nyhan, M.D.    CHIEF COMPLAINT:  Left hip pain. HISTORY OF PRESENT ILLNESS:  This is a 26-year-old male with chronic  left hip pain secondary to osteoarthritis. He has failed conservative  treatments with anti-inflammatories, analgesics, and home exercises. He  is now scheduled for left total hip arthroplasty. PAST MEDICAL HISTORY:  Depression and hypertension. PAST SURGICAL HISTORY:  Right total hip arthroplasty. CURRENT MEDICATIONS:  Ibuprofen, Norco, Advil, buspirone, and  benazepril. ALLERGIES:  To PENICILLIN. FAMILY HISTORY:  Unremarkable. SOCIAL HISTORY:  Admits to tobacco use and social alcohol consumption. PRIMARY CARE PROVIDER:  Papa Woodall DO    REVIEW OF SYSTEMS:  ALLERGIES:  As stated above. SKIN AND LYMPHATICS:  Denies rashes or lesions. CNS:  No prior CVAs. RESPIRATORY:  No cough or shortness of breath. CIRCULATORY:  No prior MIs.  DIGESTIVE:  No dyspepsia. GENITOURINARY:  No dysuria. METABOLIC AND ENDOCRINE:  No thyroid disease or diabetes. HEMATOLOGIC:  No anemia. MUSCULOSKELETAL:  Positive OA. PSYCHIATRIC:  History of depression. PHYSICAL EXAMINATION:  GENERAL APPEARANCE:  A well-nourished and well-developed 26-year-old  male, in no acute distress. Alert and oriented x3. SKIN:  Without masses, rashes, or lesions. LYMPH NODES:  No adenopathy. HEENT:  Head:  Normocephalic and atraumatic. Ears:  Clear and  functional.  Eyes and fundi:  PERRLA. Nose:  Clear without deviation.    Mouth, teeth, and throat:  Clear and functional.  NECK:  Supple. No JVD. CHEST:  Normal excursion. BREASTS:  Deferred. LUNGS:  Clear to auscultation and percussion. HEART:  Regular rate and rhythm. No murmurs, gallops, or rubs  appreciated. ABDOMEN:  Rounded, soft, and nontender. Hernia negative. BACK:  Exam nontender. EXTREMITIES:  Without clubbing, cyanosis, or edema. Exam of left hip:   90 degrees of flexion, 0 degrees of internal rotation, 10 degrees of  external rotation, 15 degrees of abduction, all with pain and crepitus. 2/2 pulses. Neurovascular status distally is intact. NEUROLOGIC:  Cranial nerves II through XII are grossly intact. GENITAL:  Exam deferred. RECTAL:  Exam deferred. DIAGNOSTIC IMPRESSION:  OA, left hip. PROCEDURE:  Left total hip arthroplasty.         Sandi Wilde PA-C    D: 07/21/2022 9:20:27       T: 07/21/2022 10:33:31     [id number]  Job#: [id number]     Doc#: [id number]

## 2022-07-26 ENCOUNTER — HOSPITAL ENCOUNTER (OUTPATIENT)
Dept: PREADMISSION TESTING | Age: 52
Discharge: HOME | End: 2022-07-26

## 2022-07-26 NOTE — PROGRESS NOTES
Pt called in today and stated he tested + for COVID and needed to cancel his PAT appointment for today. .. pt states thus far he is just having body aches, denies congestion, cough, dyspnea. . pt called PCP yesterday and was ordered medication. . reviewed [place] guidelines re: delaying surgery pt verbalzies understanding. .. instructed pt to call Dr. Nandini Harvey office to notify them also. .. LM with Dr. Nandini Harvey office re: above matter. Vira Nicole

## 2022-11-04 ENCOUNTER — HOSPITAL ENCOUNTER (OUTPATIENT)
Dept: GENERAL RADIOLOGY | Age: 52
Discharge: HOME | End: 2022-11-06
Payer: COMMERCIAL

## 2022-11-04 ENCOUNTER — HOSPITAL ENCOUNTER (OUTPATIENT)
Age: 52
Discharge: HOME | End: 2022-11-06
Payer: COMMERCIAL

## 2022-11-04 DIAGNOSIS — F17.210 CIGARETTE SMOKER: ICD-10-CM

## 2022-11-04 DIAGNOSIS — Z01.810 PRE-OPERATIVE CARDIOVASCULAR EXAMINATION: ICD-10-CM

## 2022-11-04 PROCEDURE — 71046 X-RAY EXAM CHEST 2 VIEWS: CPT

## 2022-11-10 NOTE — H&P
Karlene Townsend                    :        1970  MED REC NO:   [id number]                            ROOM:  ACCOUNT NO:   [de-identified]                           ADMIT DATE: 22  PROVIDER:     Kylee Cuenca. CHEYENNE Guo     DATE OF SERVICE:  2022. Dictating for Nik Cardona M.D.     CHIEF COMPLAINT:  Left hip pain. HISTORY OF PRESENT ILLNESS:  This is a 26-year-old male with chronic  left hip pain secondary to osteoarthritis. He has failed conservative  treatments with anti-inflammatories, analgesics, and home exercises. He  is now scheduled for left total hip arthroplasty. PAST MEDICAL HISTORY:  Depression and hypertension. PAST SURGICAL HISTORY:  Right total hip arthroplasty. CURRENT MEDICATIONS:  Ibuprofen, Norco, Advil, buspirone, and  benazepril. ALLERGIES:  To PENICILLIN. FAMILY HISTORY:  Unremarkable. SOCIAL HISTORY:  Admits to tobacco use and social alcohol consumption. PRIMARY CARE PROVIDER:  Gerard Morales DO     REVIEW OF SYSTEMS:  ALLERGIES:  As stated above. SKIN AND LYMPHATICS:  Denies rashes or lesions. CNS:  No prior CVAs. RESPIRATORY:  No cough or shortness of breath. CIRCULATORY:  No prior MIs.  DIGESTIVE:  No dyspepsia. GENITOURINARY:  No dysuria. METABOLIC AND ENDOCRINE:  No thyroid disease or diabetes. HEMATOLOGIC:  No anemia. MUSCULOSKELETAL:  Positive OA. PSYCHIATRIC:  History of depression. PHYSICAL EXAMINATION:  GENERAL APPEARANCE:  A well-nourished and well-developed 26-year-old  male, in no acute distress. Alert and oriented x3. SKIN:  Without masses, rashes, or lesions. LYMPH NODES:  No adenopathy. HEENT:  Head:  Normocephalic and atraumatic. Ears:  Clear and  functional.  Eyes and fundi:  PERRLA. Nose:  Clear without deviation. Mouth, teeth, and throat:  Clear and functional.  NECK:  Supple. No JVD. CHEST:  Normal excursion. BREASTS:  Deferred. LUNGS:  Clear to auscultation and percussion.   HEART:  Regular rate and rhythm. No murmurs, gallops, or rubs  appreciated. ABDOMEN:  Rounded, soft, and nontender. Hernia negative. BACK:  Exam nontender. EXTREMITIES:  Without clubbing, cyanosis, or edema. Exam of left hip:   90 degrees of flexion, 0 degrees of internal rotation, 10 degrees of  external rotation, 15 degrees of abduction, all with pain and crepitus. 2/2 pulses. Neurovascular status distally is intact. NEUROLOGIC:  Cranial nerves II through XII are grossly intact. GENITAL:  Exam deferred. RECTAL:  Exam deferred. DIAGNOSTIC IMPRESSION:  OA, left hip. PROCEDURE:  Left total hip arthroplasty.            Raynaldo Schwab, PA-C

## 2022-11-11 ENCOUNTER — HOSPITAL ENCOUNTER (OUTPATIENT)
Dept: PREADMISSION TESTING | Age: 52
Discharge: HOME | End: 2022-11-11
Payer: COMMERCIAL

## 2022-11-11 VITALS
DIASTOLIC BLOOD PRESSURE: 76 MMHG | WEIGHT: 203 LBS | OXYGEN SATURATION: 95 % | RESPIRATION RATE: 20 BRPM | BODY MASS INDEX: 32.62 KG/M2 | HEIGHT: 66 IN | TEMPERATURE: 98.4 F | HEART RATE: 57 BPM | SYSTOLIC BLOOD PRESSURE: 120 MMHG

## 2022-11-11 PROCEDURE — 87081 CULTURE SCREEN ONLY: CPT

## 2022-11-11 RX ORDER — IBUPROFEN 200 MG
400 TABLET ORAL EVERY 6 HOURS PRN
COMMUNITY

## 2022-11-11 RX ORDER — HYDROCODONE BITARTRATE AND ACETAMINOPHEN 5; 325 MG/1; MG/1
TABLET ORAL
Status: ON HOLD | COMMUNITY
Start: 2022-11-04 | End: 2022-11-19 | Stop reason: HOSPADM

## 2022-11-11 ASSESSMENT — HOOS JR
BENDING TO THE FLOOR TO PICK UP OBJECT: 4
WALKING ON UNEVEN SURFACE: 2
HOOS JR RAW SCORE: 15
SITTING: 2
RISING FROM SITTING: 3
HOOS JR RAW SCORE: 15
GOING UP OR DOWN STAIRS: 0
HOOS JR TOTAL INTERVAL SCORE: 43.335
LYING IN BED (TURNING OVER, MAINTAINING HIP POSITION): 4

## 2022-11-11 ASSESSMENT — PAIN DESCRIPTION - PAIN TYPE: TYPE: CHRONIC PAIN

## 2022-11-11 ASSESSMENT — PAIN DESCRIPTION - FREQUENCY: FREQUENCY: INTERMITTENT

## 2022-11-11 NOTE — PROGRESS NOTES
up or hair products on the day of surgery    [x] You can expect a call the business day prior to procedure to notify you if your arrival time changes    [x] If you receive a survey after surgery we would greatly appreciate your comments    [] Parent/guardian of a minor must accompany their child and remain on the premises  the entire time they are under our care     [] Pediatric patients may bring favorite toy, blanket or comfort item with them    [] A caregiver or family member must remain with the patient during their stay if they are mentally handicapped, have dementia, disoriented or unable to use a call light or would be a safety concern if left unattended    [x] Please notify surgeon if you develop any illness between now and time of surgery (cold, cough, sore throat, fever, nausea, vomiting) or any signs of infections  including skin, wounds, and dental.    [x]  The Outpatient Pharmacy is available to fill your prescription here on your day of surgery, ask your preop nurse for details    [x] Other instructions: wear loose, comfortable clothing    EDUCATIONAL MATERIALS PROVIDED:    [x] PAT Preoperative Education Packet/Booklet     [x] Medication List    [] Transfusion bracelet applied with instructions    [x] Shower with soap, lather and rinse well, and use CHG wipes provided the evening before surgery as instructed    [x] Incentive spirometer with instructions

## 2022-11-12 LAB — MRSA CULTURE ONLY: NORMAL

## 2022-11-17 ENCOUNTER — ANESTHESIA EVENT (OUTPATIENT)
Dept: OPERATING ROOM | Age: 52
End: 2022-11-17
Payer: COMMERCIAL

## 2022-11-18 ENCOUNTER — APPOINTMENT (OUTPATIENT)
Dept: GENERAL RADIOLOGY | Age: 52
End: 2022-11-18
Attending: ORTHOPAEDIC SURGERY
Payer: COMMERCIAL

## 2022-11-18 ENCOUNTER — ANESTHESIA (OUTPATIENT)
Dept: OPERATING ROOM | Age: 52
End: 2022-11-18
Payer: COMMERCIAL

## 2022-11-18 ENCOUNTER — HOSPITAL ENCOUNTER (OUTPATIENT)
Age: 52
Setting detail: OBSERVATION
Discharge: HOME | End: 2022-11-19
Attending: ORTHOPAEDIC SURGERY | Admitting: ORTHOPAEDIC SURGERY
Payer: COMMERCIAL

## 2022-11-18 DIAGNOSIS — M16.12 PRIMARY OSTEOARTHRITIS OF LEFT HIP: Primary | ICD-10-CM

## 2022-11-18 DIAGNOSIS — Z96.642 STATUS POST LEFT HIP REPLACEMENT: ICD-10-CM

## 2022-11-18 DIAGNOSIS — M16.12 UNILATERAL OSTEOARTHRITIS OF HIP, LEFT: ICD-10-CM

## 2022-11-18 PROCEDURE — 72170 X-RAY EXAM OF PELVIS: CPT

## 2022-11-18 PROCEDURE — 2580000003 HC RX 258: Performed by: PHYSICIAN ASSISTANT

## 2022-11-18 PROCEDURE — A4216 STERILE WATER/SALINE, 10 ML: HCPCS | Performed by: ANESTHESIOLOGY

## 2022-11-18 PROCEDURE — 2580000003 HC RX 258: Performed by: ANESTHESIOLOGY

## 2022-11-18 PROCEDURE — 6360000002 HC RX W HCPCS: Performed by: ORTHOPAEDIC SURGERY

## 2022-11-18 PROCEDURE — 2709999900 HC NON-CHARGEABLE SUPPLY: Performed by: ORTHOPAEDIC SURGERY

## 2022-11-18 PROCEDURE — 73501 X-RAY EXAM HIP UNI 1 VIEW: CPT

## 2022-11-18 PROCEDURE — 3600000015 HC SURGERY LEVEL 5 ADDTL 15MIN: Performed by: ORTHOPAEDIC SURGERY

## 2022-11-18 PROCEDURE — 88304 TISSUE EXAM BY PATHOLOGIST: CPT

## 2022-11-18 PROCEDURE — 7100000000 HC PACU RECOVERY - FIRST 15 MIN: Performed by: ORTHOPAEDIC SURGERY

## 2022-11-18 PROCEDURE — 2580000003 HC RX 258: Performed by: ORTHOPAEDIC SURGERY

## 2022-11-18 PROCEDURE — G0378 HOSPITAL OBSERVATION PER HR: HCPCS

## 2022-11-18 PROCEDURE — 2500000003 HC RX 250 WO HCPCS: Performed by: PHYSICIAN ASSISTANT

## 2022-11-18 PROCEDURE — 7100000001 HC PACU RECOVERY - ADDTL 15 MIN: Performed by: ORTHOPAEDIC SURGERY

## 2022-11-18 PROCEDURE — 6370000000 HC RX 637 (ALT 250 FOR IP): Performed by: ORTHOPAEDIC SURGERY

## 2022-11-18 PROCEDURE — 97165 OT EVAL LOW COMPLEX 30 MIN: CPT

## 2022-11-18 PROCEDURE — 3700000001 HC ADD 15 MINUTES (ANESTHESIA): Performed by: ORTHOPAEDIC SURGERY

## 2022-11-18 PROCEDURE — 2500000003 HC RX 250 WO HCPCS: Performed by: ANESTHESIOLOGY

## 2022-11-18 PROCEDURE — 2580000003 HC RX 258

## 2022-11-18 PROCEDURE — 6360000002 HC RX W HCPCS

## 2022-11-18 PROCEDURE — 88311 DECALCIFY TISSUE: CPT

## 2022-11-18 PROCEDURE — 2500000003 HC RX 250 WO HCPCS: Performed by: ORTHOPAEDIC SURGERY

## 2022-11-18 PROCEDURE — C1776 JOINT DEVICE (IMPLANTABLE): HCPCS | Performed by: ORTHOPAEDIC SURGERY

## 2022-11-18 PROCEDURE — 6370000000 HC RX 637 (ALT 250 FOR IP): Performed by: PHYSICIAN ASSISTANT

## 2022-11-18 PROCEDURE — 3700000000 HC ANESTHESIA ATTENDED CARE: Performed by: ORTHOPAEDIC SURGERY

## 2022-11-18 PROCEDURE — 3600000005 HC SURGERY LEVEL 5 BASE: Performed by: ORTHOPAEDIC SURGERY

## 2022-11-18 PROCEDURE — 2500000003 HC RX 250 WO HCPCS

## 2022-11-18 PROCEDURE — 6360000002 HC RX W HCPCS: Performed by: ANESTHESIOLOGY

## 2022-11-18 DEVICE — CERAMIC V40 FEMORAL HEAD
Type: IMPLANTABLE DEVICE | Site: HIP | Status: FUNCTIONAL
Brand: BIOLOX

## 2022-11-18 DEVICE — TRIDENT X3 0 DEGREE POLYETHYLENE INSERT
Type: IMPLANTABLE DEVICE | Site: HIP | Status: FUNCTIONAL
Brand: TRIDENT X3 INSERT

## 2022-11-18 DEVICE — 132 DEGREE NECK ANGLE HIP STEM
Type: IMPLANTABLE DEVICE | Site: HIP | Status: FUNCTIONAL
Brand: ACCOLADE

## 2022-11-18 DEVICE — SHELL TRIDENT II CLUSTERHOLE HA ACET 48D: Type: IMPLANTABLE DEVICE | Site: HIP | Status: FUNCTIONAL

## 2022-11-18 RX ORDER — MEPERIDINE HYDROCHLORIDE 25 MG/ML
12.5 INJECTION INTRAMUSCULAR; INTRAVENOUS; SUBCUTANEOUS
Status: DISCONTINUED | OUTPATIENT
Start: 2022-11-18 | End: 2022-11-18 | Stop reason: HOSPADM

## 2022-11-18 RX ORDER — ONDANSETRON 2 MG/ML
4 INJECTION INTRAMUSCULAR; INTRAVENOUS EVERY 6 HOURS PRN
Status: DISCONTINUED | OUTPATIENT
Start: 2022-11-18 | End: 2022-11-19 | Stop reason: HOSPADM

## 2022-11-18 RX ORDER — PROCHLORPERAZINE EDISYLATE 5 MG/ML
5 INJECTION INTRAMUSCULAR; INTRAVENOUS
Status: DISCONTINUED | OUTPATIENT
Start: 2022-11-18 | End: 2022-11-18 | Stop reason: HOSPADM

## 2022-11-18 RX ORDER — KETAMINE HYDROCHLORIDE 10 MG/ML
INJECTION, SOLUTION INTRAMUSCULAR; INTRAVENOUS PRN
Status: DISCONTINUED | OUTPATIENT
Start: 2022-11-18 | End: 2022-11-18 | Stop reason: SDUPTHER

## 2022-11-18 RX ORDER — SODIUM CHLORIDE 9 MG/ML
INJECTION, SOLUTION INTRAVENOUS CONTINUOUS PRN
Status: DISCONTINUED | OUTPATIENT
Start: 2022-11-18 | End: 2022-11-18 | Stop reason: SDUPTHER

## 2022-11-18 RX ORDER — DIPHENHYDRAMINE HYDROCHLORIDE 50 MG/ML
12.5 INJECTION INTRAMUSCULAR; INTRAVENOUS
Status: DISCONTINUED | OUTPATIENT
Start: 2022-11-18 | End: 2022-11-18 | Stop reason: HOSPADM

## 2022-11-18 RX ORDER — ONDANSETRON 2 MG/ML
INJECTION INTRAMUSCULAR; INTRAVENOUS PRN
Status: DISCONTINUED | OUTPATIENT
Start: 2022-11-18 | End: 2022-11-18 | Stop reason: SDUPTHER

## 2022-11-18 RX ORDER — BUPIVACAINE HYDROCHLORIDE 7.5 MG/ML
INJECTION, SOLUTION INTRASPINAL PRN
Status: DISCONTINUED | OUTPATIENT
Start: 2022-11-18 | End: 2022-11-18 | Stop reason: SDUPTHER

## 2022-11-18 RX ORDER — MIDAZOLAM HYDROCHLORIDE 1 MG/ML
INJECTION INTRAMUSCULAR; INTRAVENOUS PRN
Status: DISCONTINUED | OUTPATIENT
Start: 2022-11-18 | End: 2022-11-18 | Stop reason: SDUPTHER

## 2022-11-18 RX ORDER — METOCLOPRAMIDE HYDROCHLORIDE 5 MG/ML
10 INJECTION INTRAMUSCULAR; INTRAVENOUS ONCE
Status: COMPLETED | OUTPATIENT
Start: 2022-11-18 | End: 2022-11-18

## 2022-11-18 RX ORDER — SODIUM CHLORIDE 0.9 % (FLUSH) 0.9 %
5-40 SYRINGE (ML) INJECTION PRN
Status: DISCONTINUED | OUTPATIENT
Start: 2022-11-18 | End: 2022-11-19 | Stop reason: HOSPADM

## 2022-11-18 RX ORDER — SODIUM CHLORIDE 9 MG/ML
INJECTION, SOLUTION INTRAVENOUS PRN
Status: DISCONTINUED | OUTPATIENT
Start: 2022-11-18 | End: 2022-11-18 | Stop reason: HOSPADM

## 2022-11-18 RX ORDER — HYDRALAZINE HYDROCHLORIDE 20 MG/ML
5 INJECTION INTRAMUSCULAR; INTRAVENOUS
Status: DISCONTINUED | OUTPATIENT
Start: 2022-11-18 | End: 2022-11-18 | Stop reason: HOSPADM

## 2022-11-18 RX ORDER — SODIUM CHLORIDE 0.9 % (FLUSH) 0.9 %
5-40 SYRINGE (ML) INJECTION EVERY 12 HOURS SCHEDULED
Status: DISCONTINUED | OUTPATIENT
Start: 2022-11-18 | End: 2022-11-18 | Stop reason: HOSPADM

## 2022-11-18 RX ORDER — ONDANSETRON 4 MG/1
4 TABLET, ORALLY DISINTEGRATING ORAL EVERY 8 HOURS PRN
Status: DISCONTINUED | OUTPATIENT
Start: 2022-11-18 | End: 2022-11-19 | Stop reason: HOSPADM

## 2022-11-18 RX ORDER — LIDOCAINE HYDROCHLORIDE 20 MG/ML
INJECTION, SOLUTION EPIDURAL; INFILTRATION; INTRACAUDAL; PERINEURAL PRN
Status: DISCONTINUED | OUTPATIENT
Start: 2022-11-18 | End: 2022-11-18 | Stop reason: SDUPTHER

## 2022-11-18 RX ORDER — PHENYLEPHRINE HCL IN 0.9% NACL 1 MG/10 ML
SYRINGE (ML) INTRAVENOUS PRN
Status: DISCONTINUED | OUTPATIENT
Start: 2022-11-18 | End: 2022-11-18 | Stop reason: SDUPTHER

## 2022-11-18 RX ORDER — SODIUM CHLORIDE 9 MG/ML
INJECTION, SOLUTION INTRAVENOUS PRN
Status: DISCONTINUED | OUTPATIENT
Start: 2022-11-18 | End: 2022-11-19 | Stop reason: HOSPADM

## 2022-11-18 RX ORDER — CEFAZOLIN SODIUM 1 G/3ML
INJECTION, POWDER, FOR SOLUTION INTRAMUSCULAR; INTRAVENOUS PRN
Status: DISCONTINUED | OUTPATIENT
Start: 2022-11-18 | End: 2022-11-18 | Stop reason: SDUPTHER

## 2022-11-18 RX ORDER — ACETAMINOPHEN 500 MG
1000 TABLET ORAL ONCE
Status: COMPLETED | OUTPATIENT
Start: 2022-11-18 | End: 2022-11-18

## 2022-11-18 RX ORDER — LABETALOL HYDROCHLORIDE 5 MG/ML
5 INJECTION, SOLUTION INTRAVENOUS
Status: DISCONTINUED | OUTPATIENT
Start: 2022-11-18 | End: 2022-11-18 | Stop reason: HOSPADM

## 2022-11-18 RX ORDER — CELECOXIB 100 MG/1
200 CAPSULE ORAL ONCE
Status: COMPLETED | OUTPATIENT
Start: 2022-11-18 | End: 2022-11-18

## 2022-11-18 RX ORDER — PROPOFOL 10 MG/ML
INJECTION, EMULSION INTRAVENOUS CONTINUOUS PRN
Status: DISCONTINUED | OUTPATIENT
Start: 2022-11-18 | End: 2022-11-18 | Stop reason: SDUPTHER

## 2022-11-18 RX ORDER — ACETAMINOPHEN 500 MG
1000 TABLET ORAL EVERY 8 HOURS SCHEDULED
Status: DISCONTINUED | OUTPATIENT
Start: 2022-11-18 | End: 2022-11-19 | Stop reason: HOSPADM

## 2022-11-18 RX ORDER — DEXAMETHASONE SODIUM PHOSPHATE 4 MG/ML
INJECTION, SOLUTION INTRA-ARTICULAR; INTRALESIONAL; INTRAMUSCULAR; INTRAVENOUS; SOFT TISSUE PRN
Status: DISCONTINUED | OUTPATIENT
Start: 2022-11-18 | End: 2022-11-18 | Stop reason: SDUPTHER

## 2022-11-18 RX ORDER — FENTANYL CITRATE 50 UG/ML
25 INJECTION, SOLUTION INTRAMUSCULAR; INTRAVENOUS EVERY 5 MIN PRN
Status: DISCONTINUED | OUTPATIENT
Start: 2022-11-18 | End: 2022-11-18 | Stop reason: HOSPADM

## 2022-11-18 RX ORDER — SODIUM CHLORIDE 9 MG/ML
INJECTION, SOLUTION INTRAVENOUS CONTINUOUS
Status: DISCONTINUED | OUTPATIENT
Start: 2022-11-18 | End: 2022-11-18

## 2022-11-18 RX ORDER — DEXAMETHASONE SODIUM PHOSPHATE 10 MG/ML
8 INJECTION, SOLUTION INTRAMUSCULAR; INTRAVENOUS ONCE
Status: DISCONTINUED | OUTPATIENT
Start: 2022-11-18 | End: 2022-11-18 | Stop reason: HOSPADM

## 2022-11-18 RX ORDER — MELOXICAM 7.5 MG/1
7.5 TABLET ORAL DAILY
Status: DISCONTINUED | OUTPATIENT
Start: 2022-11-18 | End: 2022-11-19 | Stop reason: HOSPADM

## 2022-11-18 RX ORDER — SODIUM CHLORIDE 0.9 % (FLUSH) 0.9 %
5-40 SYRINGE (ML) INJECTION EVERY 12 HOURS SCHEDULED
Status: DISCONTINUED | OUTPATIENT
Start: 2022-11-18 | End: 2022-11-19 | Stop reason: HOSPADM

## 2022-11-18 RX ORDER — HYDROCODONE BITARTRATE AND ACETAMINOPHEN 5; 325 MG/1; MG/1
2 TABLET ORAL EVERY 4 HOURS PRN
Status: DISCONTINUED | OUTPATIENT
Start: 2022-11-18 | End: 2022-11-19 | Stop reason: HOSPADM

## 2022-11-18 RX ORDER — SODIUM CHLORIDE 0.9 % (FLUSH) 0.9 %
5-40 SYRINGE (ML) INJECTION PRN
Status: DISCONTINUED | OUTPATIENT
Start: 2022-11-18 | End: 2022-11-18 | Stop reason: HOSPADM

## 2022-11-18 RX ORDER — SODIUM CHLORIDE 9 MG/ML
INJECTION, SOLUTION INTRAVENOUS CONTINUOUS
Status: DISCONTINUED | OUTPATIENT
Start: 2022-11-18 | End: 2022-11-19 | Stop reason: HOSPADM

## 2022-11-18 RX ORDER — GABAPENTIN 300 MG/1
300 CAPSULE ORAL NIGHTLY
Status: DISCONTINUED | OUTPATIENT
Start: 2022-11-18 | End: 2022-11-19 | Stop reason: HOSPADM

## 2022-11-18 RX ORDER — HYDROCODONE BITARTRATE AND ACETAMINOPHEN 5; 325 MG/1; MG/1
1 TABLET ORAL EVERY 4 HOURS PRN
Status: DISCONTINUED | OUTPATIENT
Start: 2022-11-18 | End: 2022-11-19 | Stop reason: HOSPADM

## 2022-11-18 RX ORDER — FENTANYL CITRATE 50 UG/ML
INJECTION, SOLUTION INTRAMUSCULAR; INTRAVENOUS PRN
Status: DISCONTINUED | OUTPATIENT
Start: 2022-11-18 | End: 2022-11-18 | Stop reason: SDUPTHER

## 2022-11-18 RX ORDER — DEXAMETHASONE SODIUM PHOSPHATE 10 MG/ML
10 INJECTION INTRAMUSCULAR; INTRAVENOUS ONCE
Status: DISCONTINUED | OUTPATIENT
Start: 2022-11-19 | End: 2022-11-19 | Stop reason: HOSPADM

## 2022-11-18 RX ORDER — GLYCOPYRROLATE 0.2 MG/ML
INJECTION INTRAMUSCULAR; INTRAVENOUS PRN
Status: DISCONTINUED | OUTPATIENT
Start: 2022-11-18 | End: 2022-11-18 | Stop reason: SDUPTHER

## 2022-11-18 RX ADMIN — Medication 10 ML: at 22:18

## 2022-11-18 RX ADMIN — PROPOFOL 100 MCG/KG/MIN: 10 INJECTION, EMULSION INTRAVENOUS at 10:05

## 2022-11-18 RX ADMIN — CELECOXIB 200 MG: 100 CAPSULE ORAL at 07:41

## 2022-11-18 RX ADMIN — BISACODYL 5 MG: 5 TABLET, COATED ORAL at 15:41

## 2022-11-18 RX ADMIN — KETAMINE HYDROCHLORIDE 10 MG: 10 INJECTION INTRAMUSCULAR; INTRAVENOUS at 11:58

## 2022-11-18 RX ADMIN — ONDANSETRON 4 MG: 2 INJECTION INTRAMUSCULAR; INTRAVENOUS at 11:34

## 2022-11-18 RX ADMIN — TRANEXAMIC ACID 1000 MG: 100 INJECTION, SOLUTION INTRAVENOUS at 10:06

## 2022-11-18 RX ADMIN — SODIUM CHLORIDE: 9 INJECTION, SOLUTION INTRAVENOUS at 14:34

## 2022-11-18 RX ADMIN — GABAPENTIN 300 MG: 300 CAPSULE ORAL at 22:17

## 2022-11-18 RX ADMIN — SODIUM CHLORIDE: 9 INJECTION, SOLUTION INTRAVENOUS at 09:43

## 2022-11-18 RX ADMIN — ASPIRIN 325 MG: 325 TABLET, COATED ORAL at 15:41

## 2022-11-18 RX ADMIN — LIDOCAINE HYDROCHLORIDE 40 MG: 20 INJECTION, SOLUTION EPIDURAL; INFILTRATION; INTRACAUDAL; PERINEURAL at 10:05

## 2022-11-18 RX ADMIN — BUPIVACAINE HYDROCHLORIDE IN DEXTROSE 1.8 MG: 7.5 INJECTION, SOLUTION SUBARACHNOID at 10:03

## 2022-11-18 RX ADMIN — HYDROCODONE BITARTRATE AND ACETAMINOPHEN 2 TABLET: 5; 325 TABLET ORAL at 15:41

## 2022-11-18 RX ADMIN — Medication 50 MCG: at 10:47

## 2022-11-18 RX ADMIN — KETAMINE HYDROCHLORIDE 30 MG: 10 INJECTION INTRAMUSCULAR; INTRAVENOUS at 10:35

## 2022-11-18 RX ADMIN — TRANEXAMIC ACID 1000 MG: 1 INJECTION, SOLUTION INTRAVENOUS at 12:54

## 2022-11-18 RX ADMIN — CEFAZOLIN 2 G: 1 INJECTION, POWDER, FOR SOLUTION INTRAMUSCULAR; INTRAVENOUS at 10:06

## 2022-11-18 RX ADMIN — MELOXICAM 7.5 MG: 7.5 TABLET ORAL at 15:41

## 2022-11-18 RX ADMIN — FENTANYL CITRATE 25 MCG: 50 INJECTION, SOLUTION INTRAMUSCULAR; INTRAVENOUS at 10:03

## 2022-11-18 RX ADMIN — FAMOTIDINE 20 MG: 10 INJECTION, SOLUTION INTRAVENOUS at 07:41

## 2022-11-18 RX ADMIN — GLYCOPYRROLATE 0.2 MG: 0.2 INJECTION, SOLUTION INTRAMUSCULAR; INTRAVENOUS at 10:19

## 2022-11-18 RX ADMIN — SODIUM CHLORIDE: 9 INJECTION, SOLUTION INTRAVENOUS at 10:30

## 2022-11-18 RX ADMIN — Medication 100 MCG: at 10:58

## 2022-11-18 RX ADMIN — DEXAMETHASONE SODIUM PHOSPHATE 8 MG: 4 INJECTION, SOLUTION INTRAMUSCULAR; INTRAVENOUS at 10:16

## 2022-11-18 RX ADMIN — Medication 100 MCG: at 10:32

## 2022-11-18 RX ADMIN — ACETAMINOPHEN 1000 MG: 500 TABLET ORAL at 07:41

## 2022-11-18 RX ADMIN — HYDROCODONE BITARTRATE AND ACETAMINOPHEN 2 TABLET: 5; 325 TABLET ORAL at 19:59

## 2022-11-18 RX ADMIN — METOCLOPRAMIDE 10 MG: 5 INJECTION, SOLUTION INTRAMUSCULAR; INTRAVENOUS at 07:41

## 2022-11-18 RX ADMIN — WATER 2000 MG: 1 INJECTION INTRAMUSCULAR; INTRAVENOUS; SUBCUTANEOUS at 19:32

## 2022-11-18 RX ADMIN — MIDAZOLAM 2 MG: 1 INJECTION INTRAMUSCULAR; INTRAVENOUS at 09:43

## 2022-11-18 ASSESSMENT — PAIN DESCRIPTION - LOCATION
LOCATION: HIP

## 2022-11-18 ASSESSMENT — PAIN - FUNCTIONAL ASSESSMENT
PAIN_FUNCTIONAL_ASSESSMENT: PREVENTS OR INTERFERES SOME ACTIVE ACTIVITIES AND ADLS
PAIN_FUNCTIONAL_ASSESSMENT: PREVENTS OR INTERFERES SOME ACTIVE ACTIVITIES AND ADLS

## 2022-11-18 ASSESSMENT — PAIN SCALES - GENERAL
PAINLEVEL_OUTOF10: 7
PAINLEVEL_OUTOF10: 0
PAINLEVEL_OUTOF10: 7
PAINLEVEL_OUTOF10: 7
PAINLEVEL_OUTOF10: 0
PAINLEVEL_OUTOF10: 2

## 2022-11-18 ASSESSMENT — PAIN DESCRIPTION - ONSET: ONSET: ON-GOING

## 2022-11-18 ASSESSMENT — PAIN DESCRIPTION - DESCRIPTORS
DESCRIPTORS: ACHING;BURNING;DISCOMFORT
DESCRIPTORS: ACHING;DISCOMFORT;SORE
DESCRIPTORS: DISCOMFORT;DULL;TENDER

## 2022-11-18 ASSESSMENT — PAIN DESCRIPTION - ORIENTATION
ORIENTATION: LEFT

## 2022-11-18 ASSESSMENT — PAIN DESCRIPTION - FREQUENCY: FREQUENCY: INTERMITTENT

## 2022-11-18 ASSESSMENT — LIFESTYLE VARIABLES: SMOKING_STATUS: 1

## 2022-11-18 ASSESSMENT — PAIN DESCRIPTION - PAIN TYPE: TYPE: SURGICAL PAIN

## 2022-11-18 NOTE — CARE COORDINATION
Met with patient and girlfriend about diagnosis and discharge plan of care. Post op left TKA. Pt had previous ortho surgery in 2021. Lives with girlfriend in 2 story home, bed and bath on 1st floor. Has wheeled walker, toilet riser and walk in shower with grab bars. Plan is home. Pt wants [facility] home care-orders completed and notified. Will follow-mjo    The Plan for Transition of Care is related to the following treatment goals: home with home care     The Patient and/or patient representative pt was provided with a choice of provider and agrees   with the discharge plan. [x] Yes [] No    Freedom of choice list was provided with basic dialogue that supports the patient's individualized plan of care/goals, treatment preferences and shares the quality data associated with the providers.  [x] Yes [] No

## 2022-11-18 NOTE — ANESTHESIA PRE PROCEDURE
Department of Anesthesiology  Preprocedure Note       Name:  Mina Median   Age:  46 y.o.  :  1970                                          MRN:  [id number]         Date:  2022      Surgeon: Viviane Montero):  Enrike Ambriz MD    Procedure: Procedure(s):  LEFT TOTAL HIP ARTHROPLASTY  ++RAKESH++    Medications prior to admission:   Prior to Admission medications    Medication Sig Start Date End Date Taking? Authorizing Provider   HYDROcodone-acetaminophen (NORCO) 5-325 MG per tablet TAKE ONE TABLET BY MOUTH EVERY 6 HOURS AS NEEDED FOR PAIN 22   Historical Provider, MD   ibuprofen (ADVIL;MOTRIN) 200 MG tablet Take 400 mg by mouth every 6 hours as needed for Pain    Historical Provider, MD   benazepril (LOTENSIN) 40 MG tablet Take 40 mg by mouth daily  3/8/21   Historical Provider, MD   busPIRone (BUSPAR) 5 MG tablet Take 5 mg by mouth 2 times daily  21   Historical Provider, MD   Multiple Vitamins-Minerals (ONE-A-DAY MENS 50+ PO) Take 1 tablet by mouth daily LD 21    Historical Provider, MD       Current medications:    No current facility-administered medications for this encounter. Current Outpatient Medications   Medication Sig Dispense Refill    HYDROcodone-acetaminophen (NORCO) 5-325 MG per tablet TAKE ONE TABLET BY MOUTH EVERY 6 HOURS AS NEEDED FOR PAIN      ibuprofen (ADVIL;MOTRIN) 200 MG tablet Take 400 mg by mouth every 6 hours as needed for Pain      benazepril (LOTENSIN) 40 MG tablet Take 40 mg by mouth daily       busPIRone (BUSPAR) 5 MG tablet Take 5 mg by mouth 2 times daily       Multiple Vitamins-Minerals (ONE-A-DAY MENS 50+ PO) Take 1 tablet by mouth daily LD 21         Allergies:     Allergies   Allergen Reactions    Penicillins Swelling     generalized       Problem List:    Patient Active Problem List   Diagnosis Code    Primary osteoarthritis of right hip M16.11    Hypertension I10    AUDREY on CPAP G47.33, Z99.89    Class 2 severe obesity with serious comorbidity in adult [facility]) E66.01    Elevated blood sugar R73.9    Primary osteoarthritis of left hip M16.12       Past Medical History:        Diagnosis Date    Anxiety     H/O tooth extraction 03/17/2021    multiple     Hypertension     AUDREY on CPAP     osteoarthritis left hip     [facility]/2021    [facility]    Wears glasses        Past Surgical History:        Procedure Laterality Date    FRACTURE SURGERY Right     ankle, plate and screws    TOTAL HIP ARTHROPLASTY Right 5/5/2021    RIGHT TOTAL HIP ARTHROPLASTY performed by Priscila Boateng MD at [address] [place] [address] History:    Social History     Tobacco Use    Smoking status: Every Day     Packs/day: 1.50     Years: 25.00     Pack years: 37.50     Types: Cigarettes    Smokeless tobacco: Never   Substance Use Topics    Alcohol use: Yes     Comment: socially beer                                Ready to quit: Not Answered  Counseling given: Not Answered      Vital Signs (Current): There were no vitals filed for this visit.                                            BP Readings from Last 3 Encounters:   11/11/22 120/76   05/06/21 131/76   05/05/21 (!) 105/58       NPO Status:                                                                                 BMI:   Wt Readings from Last 3 Encounters:   11/11/22 203 lb (92.1 kg)   05/06/21 227 lb 3.2 oz (103.1 kg)   04/30/21 226 lb (102.5 kg)     There is no height or weight on file to calculate BMI.    CBC:   Lab Results   Component Value Date/Time    WBC 15.2 05/06/2021 04:13 AM    RBC 3.79 05/06/2021 04:13 AM    HGB 12.0 05/06/2021 04:13 AM    HGB 11.9 05/06/2021 04:13 AM    HCT 34.9 05/06/2021 04:13 AM    HCT 34.8 05/06/2021 04:13 AM    MCV 92.1 05/06/2021 04:13 AM    RDW 11.8 05/06/2021 04:13 AM     05/06/2021 04:13 AM       CMP:   Lab Results   Component Value Date/Time     05/06/2021 04:13 AM    K 4.1 05/06/2021 04:13 AM     05/06/2021 04:13 AM    CO2 19 05/06/2021 04:13 AM    BUN 16 05/06/2021 04:13 AM    CREATININE 0.7 05/06/2021 04:13 AM    GFRAA >60 05/06/2021 04:13 AM    LABGLOM >60 05/06/2021 04:13 AM    GLUCOSE 143 05/06/2021 04:13 AM    PROT 5.3 05/06/2021 04:13 AM    CALCIUM 8.1 05/06/2021 04:13 AM    BILITOT 0.2 05/06/2021 04:13 AM    ALKPHOS 42 05/06/2021 04:13 AM    AST 28 05/06/2021 04:13 AM    ALT 20 05/06/2021 04:13 AM       POC Tests: No results for input(s): POCGLU, POCNA, POCK, POCCL, POCBUN, POCHEMO, POCHCT in the last 72 hours. Coags: No results found for: PROTIME, INR, APTT    HCG (If Applicable): No results found for: PREGTESTUR, PREGSERUM, HCG, HCGQUANT     ABGs: No results found for: PHART, PO2ART, TUP6OTB, CWN1DBK, BEART, I0DXIDXE     Type & Screen (If Applicable):  No results found for: LABABO, LABRH    Drug/Infectious Status (If Applicable):  No results found for: HIV, HEPCAB    COVID-19 Screening (If Applicable):   Lab Results   Component Value Date/Time    COVID19 Not Detected 04/30/2021 07:09 AM           Anesthesia Evaluation  Patient summary reviewed and Nursing notes reviewed no history of anesthetic complications:   Airway: Mallampati: III  TM distance: >3 FB   Neck ROM: full  Mouth opening: > = 3 FB   Dental:          Pulmonary:   (+) sleep apnea: on CPAP,  decreased breath sounds: bilateral current smoker          Patient did not smoke on day of surgery. PE comment: Coarse Cardiovascular:  Exercise tolerance: good (>4 METS),   (+) hypertension: mild and no interval change,     (-) past MI, CAD, CABG/stent, dysrhythmias and  angina      Rhythm: regular  Rate: normal           Beta Blocker:  Not on Beta Blocker         Neuro/Psych:   (+) depression/anxiety    (-) seizures, TIA and CVA           GI/Hepatic/Renal: Neg GI/Hepatic/Renal ROS       (-) hepatitis and no renal disease       Endo/Other:    (+) blood dyscrasia (History of - no recent CBC available): anemia, arthritis: OA., .    Diabetes: Elevated BGM's w/o dx.  of DMII.        Pt had no PAT visit       Abdominal:         (-) obese       Vascular: negative vascular ROS. - DVT and PE. Other Findings:           Anesthesia Plan      spinal     ASA 3     (Spinal with GA/OETT back up  PONV prophylaxis)  Induction: intravenous. MIPS: Postoperative opioids intended and Prophylactic antiemetics administered. Anesthetic plan and risks discussed with patient. Plan discussed with CRNA. Quynh Johnson DO   11/18/2022    DOS STAFF ADDENDUM:    Patient seen and examined, physical exam updated as needed, chart reviewed. NPO status confirmed. Anesthetic options and risks discussed with patient/legal guardian. Patient/legal guardian verbalized understanding and agrees to proceed.      Audi Amin, APRN - CRNA  Staff CRNA  November 18, 2022  1:23 PM

## 2022-11-18 NOTE — ANESTHESIA PROCEDURE NOTES
Spinal Block    Patient location during procedure: OR  End time: 11/18/2022 10:03 AM  Reason for block: primary anesthetic  Staffing  Performed: resident/CRNA and other anesthesia staff   Resident/CRNA: ANNA MARIE Tinajero - [facility]  Other anesthesia staff: Devan Kinney RN  Spinal Block  Patient position: sitting  Prep: ChloraPrep  Patient monitoring: cardiac monitor, continuous pulse ox, continuous capnometry, frequent blood pressure checks and oxygen  Approach: midline  Location: L3/L4  Guidance: paresthesia technique  Provider prep: mask and sterile gloves  Local infiltration: lidocaine  Needle  Needle type: Pencan   Needle gauge: 22 G  Needle length: 3.5 in  Assessment  Sensory level: T6  Events: cerebrospinal fluid  Swirl obtained: Yes  CSF: clear  Attempts: 1  Hemodynamics: stable  Preanesthetic Checklist  Completed: patient identified, IV checked, site marked, risks and benefits discussed, surgical/procedural consents, equipment checked, pre-op evaluation, timeout performed, anesthesia consent given, oxygen available, monitors applied/VS acknowledged, fire risk safety assessment completed and verbalized and blood product R/B/A discussed and consented

## 2022-11-18 NOTE — PROGRESS NOTES
Occupational Therapy    OCCUPATIONAL THERAPY INITIAL EVALUATION     Krissy Finley [facility], [address]                                                  Patient Name: Jesse Gonzalez    MRN: [id number]    : 1970    Room: 705      Evaluating OT: Jeanette Penny OTR/L   [id number]      Referring Mary Parra MD    Specific Provider Orders/Date:OT eval and treat 2022      Diagnosis:  Unilateral osteoarthritis of hip, left [M16.12]  Primary osteoarthritis of left hip [M16.12]    Surgery: L LINDSEY 2022  Pertinent Medical history:  R LINDSEY      Precautions:  Fall Risk, WBAT LE LE, posterolateral hip precautions      Assessment of current deficits    [x] Functional mobility  [x]ADLs  [x] Strength               []Cognition    [x] Functional transfers   [x] IADLs         [x] Safety Awareness   [x]Endurance    [] Fine Coordination              [x] Balance      [] Vision/perception   []Sensation     []Gross Motor Coordination  [] ROM  [] Delirium                   [] Motor Control     OT PLAN OF CARE   OT POC based on physician orders, patient diagnosis and results of clinical assessment    Frequency/Duration  2-4 days/wk for 2 weeks PRN   Specific OT Treatment Interventions to include:   ADL retraining/adapted techniques and AE recommendations to increase functional independence within precautions                    Energy conservation techniques to improve tolerance for selfcare routine   Functional transfer/mobility training/DME recommendations for increased independence, safety and fall prevention         Patient/family education to increase safety and functional independence             Environmental modifications for safe mobility and completion of ADLs                             Therapeutic activity to improve functional performance during ADLs.                                          Therapeutic tasks       E [facility] WFL good  and wfl FMC/dexterity noted during ADL tasks       Hearing: WFL   Sensation:  No c/o numbness or tingling   Tone: WFL   Edema: none observed     Comments: Upon arrival patient lying in bed . At end of session, patient sitting in chair  with call light and phone within reach, all lines and tubes intact. Overall patient demonstrated  decreased independence and safety during completion of ADL/functional transfer/mobility tasks. Pt would benefit from continued skilled OT to increase safety and independence with completion of ADL/IADL tasks for functional independence and quality of life. Comments/Treatment:      Patient practiced and was instructed on following during evaluation and OT session:          -Bed mobility - technique and safety         -Tranfers - hand placement, tech and safety         -Mobility - safety, and tech with  a device         -ADLs - tech, AE if appropriate/needed - good understanding of AE = has all AE           -Education: , importance of OOB activity and participating in ADLs, reinforced hip preautions, safety awareness             Rehab Potential: good for established goals     Patient / Family Goal: return home      Patient and/or family were instructed on functional diagnosis, prognosis/goals and OT plan of care. Demonstrated good  understanding.      Eval Complexity: Low    Time In: 1537  Time Out: 1405  Total Treatment Time: 10    Min Units   OT Eval Low 97165 x  1   OT Eval Medium 33693      OT Eval High 09466      OT Re-Eval [id number]       Therapeutic Ex 61119      Therapeutic Activities 57091  10  1   ADL/Self Care 12336       Orthotic Management 65509       Manual 19102     Neuro Re-Ed 62070       Non-Billable Time         Evaluation Time additionally includes thorough review of current medical information, gathering information on past medical history/social history and prior level of function, interpretation of standardized testing/informal observation of tasks, assessment of data and development of plan of care and goals.             Willie Ambriz  OTR/L  OT [id number]

## 2022-11-18 NOTE — PROGRESS NOTES
Patient's glasses returned to him from lobby in PACU.  Patient came out of OR able to wiggle toes s/p spinal.

## 2022-11-18 NOTE — OP NOTE
patient was cleared medically, came to the operating room on 11/18/2022 . PROCEDURE:  The patient came to the operating room and underwent  spinal anesthesia. The patient was positioned in the right lateral decubitus position for a left hip procedure. The area of the left hip and lower extremity were prepped and draped in the usual sterile manner. We made a standard curvilinear incision for a posterior approach to the left hip. We carried incision through the skin and subcutaneous fat to layer of the fascia. We crossed the fascia in line with the incision to expose the greater trochanter. We took down the short external rotators from the posterior aspect of the greater trochanter to expose the posterior aspect of the hip joint capsule. We incised the capsule around the base of the neck and Td the capsule back toward the acetabular rim. We put tag sutures in the posterior leaflets of the capsule. We posteriorly dislocated the hip. We made a femoral neck cut using a size 4 broach with a 132 degree neck with a 32+0 head as a template for our femoral neck cut. We removed the femoral head. We retracted the proximal femur anteriorly. We gained exposure to the acetabulum resecting any unneeded periacetabular soft tissue. We began reaming with a size 42 mm diameter reamer, reaming up to a size 48 mm diameter. We inserted a trial 48 mm Trident cup with good purchase. We therefore implanted the actual 48 mm Trident 2 acetabular component with approximately 40 degrees of abduction and 25 degrees for forward flexion. The cup was fully seated. We had excellent purchase. We inserted a trial size D, 32 mm poly and resected osteophytes from around the margin of the cup particularly anteriorly. We then turned our attention to the femur. With the hip flexed and internally rotated we gained access to the proximal femur using first a box chisel followed by the canal finding reamer.   We broached to a size 4 with excellent proximal filling. We applied our trial 132 degree neck and a 32+0 head and reduced the hip. There was some soft tissue laxity, so we trialed a 32 mm + 4 mm head. This gave us excellent soft tissue tension. We had excellent stability with flexion to 90 degrees and internal rotation to about 90 degrees. We were also stable in extension and external rotation, as well as mid flexion, adduction, and internal rotation. We therefore, chose this combination of implants. We removed all the trials. We inserted the actual X-3 poly size D, 32 mm for the 48 mm Trident 2 cup. We then implanted the actual Stefany Accolade 2 femoral component, size 4, 132 degree neck which was fully seated with good purchase. We then did a final trial reduction with a 32+4 head with excellent stability and soft tissue tension as previously described. We applied the actual femoral head component and reduced the hip. Then we copiously irrigated the wound. We closed the capsule posteriorly with #1 Ethibond figure of eight suture. We then repaired the short external rotators back to the posterior margins of the greater trochanter with drill holes through bone. We closed the tensor fascia with #1 Stratofix running suture. We closed the subcu layer with 2-0 Vicryl inverted suture, and the skin with a running 3-0 Monocryl subcuticular stitch. Steri-strips were applied to the skin. Sterile dressing was applied with Aquacel. The patient was placed in an abductor pillow and turned supine on the hospital bed. The patient was retrieved from anesthesia and taken to recovery room in good condition, having tolerated the procedure well. All needle, sponge and instrument counts were correct. SUMMARY OF IMPLANTS:  We used Stefany implants with 48 mm Trident 2 acetabular component with size D, 32 mm X-3 polyethylene insert.   We used [place] Accolade 2 femoral component size 4, 132 degree neck with a Biolox ceramic 32 mm + 4 mm head. SPECIMEN:  Femoral head      Denver Moreno PA-C, was present for the entire procedure, assisting in positioning & draping, each step of the procedure, and wound closure.       Sophia Gerard MD          Electronically signed by Sophia Gerard MD on 11/18/2022 at 11:49 AM

## 2022-11-18 NOTE — ANESTHESIA POSTPROCEDURE EVALUATION
Department of Anesthesiology  Postprocedure Note    Patient: Maddy Franklin  MRN: [id number]  YOB: 1970  Date of evaluation: 11/18/2022      Procedure Summary     Date: 11/18/22 Room / Location: [facility] OR 02 / [facility]    Anesthesia Start: 1070 Anesthesia Stop:     Procedure: LEFT TOTAL HIP ARTHROPLASTY (Left) Diagnosis:       Unilateral osteoarthritis of hip, left      (LEFT HIP OSTEOARTHRITIS)    Surgeons: Mindy Hayward MD Responsible Provider: Caitlin Quevedo DO    Anesthesia Type: spinal ASA Status: 3          Anesthesia Type: No value filed. Carley Phase I: Carley Score: 10    Carley Phase II:        Anesthesia Post Evaluation    Patient location during evaluation: bedside  Patient participation: complete - patient participated  Level of consciousness: awake  Pain score: 2  Airway patency: patent  Nausea & Vomiting: no vomiting and no nausea  Complications: no  Cardiovascular status: hemodynamically stable  Respiratory status: acceptable  Hydration status: stable  Comments: Seen and examined. Progressing well. No questions or concerns.   Multimodal analgesia pain management approach

## 2022-11-19 VITALS
DIASTOLIC BLOOD PRESSURE: 77 MMHG | OXYGEN SATURATION: 99 % | RESPIRATION RATE: 16 BRPM | SYSTOLIC BLOOD PRESSURE: 128 MMHG | TEMPERATURE: 97.5 F | HEART RATE: 74 BPM | BODY MASS INDEX: 33.09 KG/M2 | WEIGHT: 205 LBS

## 2022-11-19 PROBLEM — Z96.642 STATUS POST LEFT HIP REPLACEMENT: Status: ACTIVE | Noted: 2022-11-19

## 2022-11-19 LAB
HCT VFR BLD CALC: 42.8 % (ref 37–54)
HEMOGLOBIN: 14.3 G/DL (ref 12.5–16.5)

## 2022-11-19 PROCEDURE — 6360000002 HC RX W HCPCS: Performed by: ORTHOPAEDIC SURGERY

## 2022-11-19 PROCEDURE — G0378 HOSPITAL OBSERVATION PER HR: HCPCS

## 2022-11-19 PROCEDURE — 85018 HEMOGLOBIN: CPT

## 2022-11-19 PROCEDURE — 2580000003 HC RX 258: Performed by: ORTHOPAEDIC SURGERY

## 2022-11-19 PROCEDURE — 6370000000 HC RX 637 (ALT 250 FOR IP): Performed by: INTERNAL MEDICINE

## 2022-11-19 PROCEDURE — 97162 PT EVAL MOD COMPLEX 30 MIN: CPT

## 2022-11-19 PROCEDURE — 36415 COLL VENOUS BLD VENIPUNCTURE: CPT

## 2022-11-19 PROCEDURE — 85014 HEMATOCRIT: CPT

## 2022-11-19 PROCEDURE — 6370000000 HC RX 637 (ALT 250 FOR IP): Performed by: ORTHOPAEDIC SURGERY

## 2022-11-19 RX ORDER — HYDROCODONE BITARTRATE AND ACETAMINOPHEN 5; 325 MG/1; MG/1
1-2 TABLET ORAL EVERY 4 HOURS PRN
Qty: 84 TABLET | Refills: 0 | Status: SHIPPED | OUTPATIENT
Start: 2022-11-19 | End: 2022-11-26

## 2022-11-19 RX ORDER — BUSPIRONE HYDROCHLORIDE 10 MG/1
5 TABLET ORAL 2 TIMES DAILY
Status: DISCONTINUED | OUTPATIENT
Start: 2022-11-19 | End: 2022-11-19 | Stop reason: HOSPADM

## 2022-11-19 RX ORDER — LISINOPRIL 20 MG/1
40 TABLET ORAL DAILY
Status: DISCONTINUED | OUTPATIENT
Start: 2022-11-19 | End: 2022-11-19 | Stop reason: HOSPADM

## 2022-11-19 RX ORDER — ASPIRIN 325 MG
325 TABLET, DELAYED RELEASE (ENTERIC COATED) ORAL DAILY
Qty: 28 TABLET | Refills: 0 | Status: SHIPPED | OUTPATIENT
Start: 2022-11-19 | End: 2022-12-17

## 2022-11-19 RX ADMIN — WATER 2000 MG: 1 INJECTION INTRAMUSCULAR; INTRAVENOUS; SUBCUTANEOUS at 03:40

## 2022-11-19 RX ADMIN — MELOXICAM 7.5 MG: 7.5 TABLET ORAL at 07:33

## 2022-11-19 RX ADMIN — HYDROCODONE BITARTRATE AND ACETAMINOPHEN 1 TABLET: 5; 325 TABLET ORAL at 06:57

## 2022-11-19 RX ADMIN — BISACODYL 5 MG: 5 TABLET, COATED ORAL at 07:34

## 2022-11-19 RX ADMIN — HYDROCODONE BITARTRATE AND ACETAMINOPHEN 1 TABLET: 5; 325 TABLET ORAL at 11:30

## 2022-11-19 RX ADMIN — ASPIRIN 325 MG: 325 TABLET, COATED ORAL at 07:33

## 2022-11-19 RX ADMIN — HYDROCODONE BITARTRATE AND ACETAMINOPHEN 1 TABLET: 5; 325 TABLET ORAL at 01:48

## 2022-11-19 RX ADMIN — BUSPIRONE HYDROCHLORIDE 5 MG: 10 TABLET ORAL at 11:21

## 2022-11-19 RX ADMIN — LISINOPRIL 40 MG: 20 TABLET ORAL at 11:22

## 2022-11-19 ASSESSMENT — PAIN DESCRIPTION - LOCATION
LOCATION: HIP

## 2022-11-19 ASSESSMENT — PAIN DESCRIPTION - DESCRIPTORS
DESCRIPTORS: ACHING;SORE
DESCRIPTORS: ACHING;SORE;TENDER
DESCRIPTORS: ACHING;DISCOMFORT

## 2022-11-19 ASSESSMENT — PAIN DESCRIPTION - ORIENTATION
ORIENTATION: LEFT

## 2022-11-19 ASSESSMENT — PAIN SCALES - GENERAL
PAINLEVEL_OUTOF10: 4
PAINLEVEL_OUTOF10: 4
PAINLEVEL_OUTOF10: 0
PAINLEVEL_OUTOF10: 6

## 2022-11-19 NOTE — CONSULTS
[facility]  Initial Consult Note      REASON FOR CONSULTATION:  med management     ATTENDING/ADMITTING PHYSICIAN: ortho Dr. Ronny root MD     HISTORY OF PRESENT ILLNESS:      The patient is a 46 y.o. male patient of will root md  who presents with lef thip pain . 46 yr old otis with left hip arthroplasty . Tolerated procedure well . Worked with pt. We are asked to follow for med management . He is with pain to be expected. No cp or sob. [employer] for hoem from medicine         Past Medical History:   Diagnosis Date    Anxiety     H/O tooth extraction 03/17/2021    multiple     Hypertension     AUDREY on CPAP     osteoarthritis left hip     Right hip pain 04/2021    for [facility] 05/2021    Wears glasses            Past Surgical History:   Procedure Laterality Date    FRACTURE SURGERY Right     ankle, plate and screws    TOTAL HIP ARTHROPLASTY Right 5/5/2021    RIGHT TOTAL HIP ARTHROPLASTY performed by Krystina Cortez MD at [facility] Left 11/18/2022    LEFT TOTAL HIP ARTHROPLASTY performed by Krystina Cortez MD at [facility] OR       Medications Prior to Admission:    Medications Prior to Admission: HYDROcodone-acetaminophen ([address]) 5-325 MG per tablet, TAKE ONE TABLET BY MOUTH EVERY 6 HOURS AS NEEDED FOR PAIN  ibuprofen (ADVIL;MOTRIN) 200 MG tablet, Take 400 mg by mouth every 6 hours as needed for Pain  benazepril (LOTENSIN) 40 MG tablet, Take 40 mg by mouth daily   busPIRone (BUSPAR) 5 MG tablet, Take 5 mg by mouth 2 times daily   Multiple Vitamins-Minerals (ONE-A-DAY MENS 50+ PO), Take 1 tablet by mouth daily LD 04/30/21    Allergies:    Penicillins    Social History:    reports that he has been smoking cigarettes. He has a 37.50 pack-year smoking history. He has never used smokeless tobacco. He reports current alcohol use. He reports that he does not currently use drugs. Family History:   family history is not on file.     REVIEW OF SYSTEMS:  As above in the HPI, otherwise negative    PHYSICAL EXAM:    Vitals:  /77   Pulse 74   Temp 97.5 °F (36.4 °C) (Oral)   Resp 16   Wt 205 lb (93 kg)   SpO2 99%   BMI 33.09 kg/m²     General:  Awake, alert, oriented X 3. Well developed, well nourished, well groomed. No apparent distress. HEENT:  Normocephalic, atraumatic. Pupils equal, round, reactive to light. No scleral icterus. No conjunctival injection. Normal lips, teeth, and gums. No nasal discharge. Neck:  Supple  Heart:  RRR, no murmurs, gallops, rubs  Lungs:  CTA bilaterally, bilat symmetrical expansion, no wheeze, rales, or rhonchi  Abdomen: Bowel sounds present, soft, nontender, no masses, no organomegaly, no peritoneal signs  Extremities: lef thip arthro , intact pulse .  Warm foot , no acute issues   Skin:  Warm and dry, no open lesions or rash  Neuro:  Cranial nerves 2-12 intact, no focal deficits  Breast: deferred  Rectal: deferred  Genitalia:  deferred    LABS:    CBC with Differential:    Lab Results   Component Value Date/Time    WBC 15.2 05/06/2021 04:13 AM    RBC 3.79 05/06/2021 04:13 AM    HGB 14.3 11/19/2022 03:30 AM    HCT 42.8 11/19/2022 03:30 AM     05/06/2021 04:13 AM    MCV 92.1 05/06/2021 04:13 AM    MCH 31.7 05/06/2021 04:13 AM    MCHC 34.4 05/06/2021 04:13 AM    RDW 11.8 05/06/2021 04:13 AM    LYMPHOPCT 7.3 05/06/2021 04:13 AM    MONOPCT 8.8 05/06/2021 04:13 AM    BASOPCT 0.1 05/06/2021 04:13 AM    MONOSABS 1.33 05/06/2021 04:13 AM    LYMPHSABS 1.10 05/06/2021 04:13 AM    EOSABS 0.00 05/06/2021 04:13 AM    BASOSABS 0.01 05/06/2021 04:13 AM     BMP:    Lab Results   Component Value Date/Time     05/06/2021 04:13 AM    K 4.1 05/06/2021 04:13 AM     05/06/2021 04:13 AM    CO2 19 05/06/2021 04:13 AM    BUN 16 05/06/2021 04:13 AM    LABALBU 3.4 05/06/2021 04:13 AM    CREATININE 0.7 05/06/2021 04:13 AM    CALCIUM 8.1 05/06/2021 04:13 AM    GFRAA >60 05/06/2021 04:13 AM    LABGLOM >60 05/06/2021 04:13 AM    GLUCOSE 143 05/06/2021 04:13 AM       ASSESSMENT:      Patient Active Problem List   Diagnosis    Primary osteoarthritis of right hip    Hypertension    AUDREY on CPAP    Class 2 severe obesity with serious comorbidity in adult (HCC)    Elevated blood sugar    Primary osteoarthritis of left hip    Status post left hip replacement       PLAN:    The patient is a 46 y.o. male patient of will root md  who presents with lef thip pain .  46 yr old otis with left hip arthroplasty     H/h 14/42  No acute issues except pain tbe   Continue home meds   Ok for dc from med  Normal vitals   F/u with pcp in 1 week   Continue outpt pt     Note that over 50 minutes was spent in evaluation of the patient, review of the chart and pertinent records, discussion with family/staff, etc    Velma Barba MD    7:55 AM  11/19/2022

## 2022-11-19 NOTE — PROGRESS NOTES
Physical Therapy  Facility/Department: [facility] SURGERY  Physical Therapy Initial Assessment    Name: Sintia Vigil  : 1970  MRN: [id number]  Date of Service: 2022         Patient Diagnosis(es): The primary encounter diagnosis was Primary osteoarthritis of left hip. Diagnoses of Unilateral osteoarthritis of hip, left and Status post left hip replacement were also pertinent to this visit. Past Medical History:  has a past medical history of Anxiety, H/O tooth extraction, Hypertension, AUDREY on CPAP, osteoarthritis left hip, Right hip pain, and Wears glasses. Past Surgical History:  has a past surgical history that includes fracture surgery (Right); Total hip arthroplasty (Right, 2021); and Total hip arthroplasty (Left, 2022). Evaluating Therapist: Deepak Jones PT    Room #:  0705/0705-A  Diagnosis:  Unilateral osteoarthritis of hip, left [M16.12]  Primary osteoarthritis of left hip [M16.12]  Procedure/Surgery:  L LINDSEY  Precautions:  WBAT, posterolateral hip precautiosn      Social:  Pt lives alone in a 2 floor plan  with first floor setup 3 steps and 2 rails to enter. Pt states his girlfriend will be staying with him at discharge  Prior to admission independent without device. Works as a . Has ww from previous hip surgery     Initial Evaluation  Date: 22 Treatment      Short Term/ Long Term   Goals   Was pt agreeable to Eval/treatment? yes     Does pt have pain?  L LE     Bed Mobility  Rolling: NT  Supine to sit: NT  Sit to supine: NT  Scooting: NT  independent   Transfers Sit to stand: independent  Stand to sit: independent  Stand pivot: independent   independent   Ambulation    400 feet with ww with supervision/independent  400 feet with ww independent   Stair Negotiation  Ascended and descended  4 steps with 2 rail with  SBA   4 steps with 2 rails indepenent   LE strength     R LE 4/5 L LE 3+/5       balance      Good with ww     AM-PAC Raw score 20/24         Pt is alert and Oriented   LE ROM: WFL of precautions  Sensation: intact  Endurance: good       ASSESSMENT:    Pt displays functional ability as noted in the objective portion of this evaluation. Patient education  Pt educated on hip precautions    Patient response to education:   Pt verbalized understanding Pt demonstrated skill Pt requires further education in this area   yes yes         Comments:  Pt demonstrated good understanding of hip precautions. Cues for LE sequence on stairs. Pt's/ family goals   1. To return home today    Conditions Requiring Skilled Therapeutic Intervention:    [x]Decreased strength     []Decreased ROM  [x]Decreased functional mobility  []Decreased balance   []Decreased endurance   []Decreased posture  []Decreased sensation  []Decreased coordination   []Decreased vision  []Decreased safety awareness   []Increased pain       Patient and or family understand(s) diagnosis, prognosis, and plan of care.     Prognosis is good for reaching above PT goals    PHYSICAL THERAPY PLAN OF CARE:    PT POC is established based on physician order and patient diagnosis     Referring provider/PT Order: Patricia Rosa MD/ PT eval and treat      Current Treatment Recommendations:     [x] Strengthening to improve independence with functional mobility   [] ROM to improve independence with functional mobility   [] Balance Training to improve static/dynamic balance and to reduce fall risk  [] Endurance Training to improve activity tolerance during functional mobility   [x] Transfer Training to improve safety and independence with all functional transfers   [x] Gait Training to improve gait mechanics, endurance and assess need for appropriate assistive device  [x] Stair Training in preparation for safe discharge home and/or into the community   [] Positioning to prevent skin breakdown and contractures  [x] Safety and Education Training   [x] Patient/Caregiver Education   [] HEP  [] Other PT long term treatment goals are located in above grid    Frequency of treatments: BID k x 2 days. Time in  0955  Time out  1012      Evaluation Time includes thorough review of current medical information, gathering information on past medical history/social history and prior level of function, completion of standardized testing/informal observation of tasks, assessment of data and education on plan of care and goals.       CPT codes:  [x] Low Complexity PT evaluation 19717  [] Moderate Complexity PT evaluation 72431  [] High Complexity PT evaluation 03023  [] PT Re-evaluation 13335  [] Gait training 61558 minutes  [] Manual therapy 76929 minutes  [] Therapeutic activities 29029 minutes  [] Therapeutic exercises 73566 minutes  [] Neuromuscular reeducation 75809 minutes     [facility] PT [id number]

## 2022-11-19 NOTE — DISCHARGE INSTRUCTIONS
Orthopaedic Discharge Instructions:    1)  MVI for home care    2)  ECASA 325 mg daily for 4 weeks after surgery    3)  Durable Medical Equipment (DME) as needed    4)  Home Physical Therapy    5)  May shower at home with Aquacel dressing in place. 6)  Wear compression stockings during the day. Okay to remove at bedtime. 7)  Elevate legs as needed to manage swelling. 8)  Remove Aquacel dressing post op day 7. Leave incision open to air.

## 2022-11-19 NOTE — PLAN OF CARE
Problem: Discharge Planning  Goal: Discharge to home or other facility with appropriate resources  Outcome: Progressing  Flowsheets (Taken 11/18/2022 1443 by Chani Stanley RN)  Discharge to home or other facility with appropriate resources: Identify barriers to discharge with patient and caregiver     Problem: Pain  Goal: Verbalizes/displays adequate comfort level or baseline comfort level  Outcome: Progressing  Flowsheets (Taken 11/18/2022 1915)  Verbalizes/displays adequate comfort level or baseline comfort level:   Assess pain using appropriate pain scale   Administer analgesics based on type and severity of pain and evaluate response     Problem: Safety - Adult  Goal: Free from fall injury  Outcome: Progressing

## 2022-11-19 NOTE — PROGRESS NOTES
Total Joint    Post op Day  1      S:  Complaints: none    O:  Afebrile, Vital signs stable     Dressing Intact   Full range of motion left ankle and toes   Sensation intact to light touch     H/H:   Recent Labs     11/19/22  0330   HGB 14.3   HCT 42.8        PT/INR: No results for input(s): PROT, INR in the last 72 hours. Xray:  stable implants    A/P:  Stable   Acute post-op blood loss anemia-stable   Proceed with Physical Therapy                            Heplock IV's if PO intake is adequate              Enteric-coated aspirin 325 mg daily for DVT prophylaxis   Evaluate for Home Discharge    Home health care needed secondary to unsteady gait, walker for ambulation,                      and need for home physical therapy.

## 2023-03-03 ENCOUNTER — HOSPITAL ENCOUNTER (OUTPATIENT)
Dept: ULTRASOUND IMAGING | Age: 53
End: 2023-03-03
Payer: COMMERCIAL

## 2023-03-03 DIAGNOSIS — R31.0 GROSS HEMATURIA: ICD-10-CM

## 2023-03-03 PROCEDURE — 76770 US EXAM ABDO BACK WALL COMP: CPT

## 2024-04-13 ENCOUNTER — HOSPITAL ENCOUNTER (EMERGENCY)
Age: 54
Discharge: HOME | End: 2024-04-13
Attending: EMERGENCY MEDICINE
Payer: COMMERCIAL

## 2024-04-13 ENCOUNTER — APPOINTMENT (OUTPATIENT)
Dept: GENERAL RADIOLOGY | Age: 54
End: 2024-04-13
Payer: COMMERCIAL

## 2024-04-13 VITALS
BODY MASS INDEX: 33.09 KG/M2 | SYSTOLIC BLOOD PRESSURE: 139 MMHG | OXYGEN SATURATION: 96 % | DIASTOLIC BLOOD PRESSURE: 83 MMHG | RESPIRATION RATE: 18 BRPM | HEIGHT: 66 IN | HEART RATE: 63 BPM | TEMPERATURE: 97.7 F

## 2024-04-13 DIAGNOSIS — S63.502A SPRAIN OF LEFT WRIST, INITIAL ENCOUNTER: Primary | ICD-10-CM

## 2024-04-13 DIAGNOSIS — S46.912A STRAIN OF LEFT SHOULDER, INITIAL ENCOUNTER: ICD-10-CM

## 2024-04-13 PROCEDURE — 73110 X-RAY EXAM OF WRIST: CPT

## 2024-04-13 PROCEDURE — 99283 EMERGENCY DEPT VISIT LOW MDM: CPT

## 2024-04-13 PROCEDURE — 73030 X-RAY EXAM OF SHOULDER: CPT

## 2024-04-13 NOTE — ED PROVIDER NOTES
signs and symptoms on which to return to the emergency room for. Patient was instructed to return to the ER for any new or worsening symptoms. Additional discharge instructions were given verbally. All questions were answered. Patient is comfortable and agreeable with discharge plan. Patient in no acute distress and non-toxic in appearance.     Shared decision making was used to determine testing, treatments and disposition.      Re-Evaluations:     Re-evaluation.  Patient’s symptoms are improving  Repeat physical examination is not changed      CONSULTS: (Who and What was discussed)  none      Counseling:  The emergency provider has spoken with the patient and discussed today’s results, in addition to providing specific details for the plan of care and counseling regarding the diagnosis and prognosis.  Questions are answered at this time and they are agreeable with the plan.    I am the Primary Clinician of Record.     --------------------------------- IMPRESSION AND DISPOSITION ---------------------------------    IMPRESSION  1. Sprain of left wrist, initial encounter    2. Strain of left shoulder, initial encounter        DISPOSITION  Disposition: Discharge to home  Patient condition is stable    PATIENT REFERRED TO:  Luis Miguel Freitas MD  [address]  [facility] [address] [phone]    Call today      Holly Chandra, DO  [address]  [facility] [address] [phone]    Schedule an appointment as soon as possible for a visit       [facility] Emergency Department  [address] [place] [address] [phone]  Go to   If symptoms worsen      DISCHARGE MEDICATIONS:  New Prescriptions    No medications on file       DISCONTINUED MEDICATIONS:  Discontinued Medications    No medications on file              (Please note that portions of this note were completed with a voice recognition program.  Efforts were made to edit the dictations but occasionally

## (undated) DEVICE — SUTURE STRATAFIX SYMMETRIC SZ 1 L18IN ABSRB VLT CT1 L36CM SXPP1A404

## (undated) DEVICE — BLADE CLIPPER GEN PURP NS

## (undated) DEVICE — TOWEL,OR,DSP,ST,BLUE,STD,6/PK,12PK/CS: Brand: MEDLINE

## (undated) DEVICE — 3M™ COBAN™ NL STERILE NON-LATEX SELF-ADHERENT WRAP, 2084S, 4 IN X 5 YD (10 CM X 4,5 M), 18 ROLLS/CASE: Brand: 3M™ COBAN™

## (undated) DEVICE — DRESSING HYDROFIBER AQUACEL AG ADVANTAGE 3.5X14 IN

## (undated) DEVICE — TUBE IRRIG HNDPC HI FLO TP INTRPULS W/SUCTION TUBE

## (undated) DEVICE — 3M™ STERI-DRAPE™ U-DRAPE 1015: Brand: STERI-DRAPE™

## (undated) DEVICE — 2108 SERIES SAGITTAL BLADE, OFFSET (20.0 X 0.89 X 80.0MM)

## (undated) DEVICE — INSTRUMENT SYSTEM 7 BATTERY REUSABLE

## (undated) DEVICE — TOTAL HIP PK

## (undated) DEVICE — Z DISCONTINUED USE 2744636  DRESSING AQUACEL 14 IN ALG W3.5XL14IN POLYUR FLM CVR W/ HYDRCOLL

## (undated) DEVICE — SET ORTHO STD STORTSTD1

## (undated) DEVICE — DRAPE,REIN 53X77,STERILE: Brand: MEDLINE

## (undated) DEVICE — STRIP,CLOSURE,WOUND,MEDI-STRIP,1/2X4: Brand: MEDLINE

## (undated) DEVICE — SOLUTION IV IRRIG POUR BRL 0.9% SODIUM CHL 2F7124

## (undated) DEVICE — GLOVE SURG SZ 8 L12IN FNGR THK94MIL TRNSLUC YEL LTX HYDRGEL

## (undated) DEVICE — NEEDLE HYPO 22GA L1.5IN BLK POLYPR HUB S STL REG BVL STR

## (undated) DEVICE — CONVERTORS STOCKINETTE: Brand: CONVERTORS

## (undated) DEVICE — DOUBLE BASIN SET: Brand: MEDLINE INDUSTRIES, INC.

## (undated) DEVICE — BIT DRL L5IN DIA2.4MM STD ST S STL TWST BUSA

## (undated) DEVICE — Device

## (undated) DEVICE — PACK PROCEDURE SURG GEN CUST

## (undated) DEVICE — 3M™ IOBAN™ 2 ANTIMICROBIAL INCISE DRAPE 6651EZ: Brand: IOBAN™ 2

## (undated) DEVICE — SUTURE V-LOC 180 SZ 2-0 L12IN ABSRB GRN V-20 L26MM 1/2 CIR VLOCL0615

## (undated) DEVICE — GLOVE ORANGE PI 7 1/2   MSG9075

## (undated) DEVICE — CONTROL SYRINGE LUER-LOCK TIP: Brand: MONOJECT

## (undated) DEVICE — COVER,LIGHT HANDLE,FLX,2/PK: Brand: MEDLINE INDUSTRIES, INC.

## (undated) DEVICE — CHLORAPREP 26ML ORANGE

## (undated) DEVICE — BASIC SINGLE BASIN 1-LF: Brand: MEDLINE INDUSTRIES, INC.

## (undated) DEVICE — 1000 S-DRAPE TOWEL DRAPE 10/BX: Brand: STERI-DRAPE™

## (undated) DEVICE — TRAY SYSTEM 7 DRILL REUSABLE

## (undated) DEVICE — GOWN,SIRUS,POLYRNF,BRTHSLV,LG,30/CS: Brand: MEDLINE

## (undated) DEVICE — SYRINGE,CONTROL,LL,FINGER,GRIP: Brand: MEDLINE INDUSTRIES, INC.

## (undated) DEVICE — GLOVE SURG SZ 85 L12IN FNGR THK13MIL WHT ISOLEX POLYISOPRENE

## (undated) DEVICE — TUBING, SUCTION, 9/32" X 10', STRAIGHT: Brand: MEDLINE

## (undated) DEVICE — ELECTRODE PT RET AD L9FT HI MOIST COND ADH HYDRGEL CORDED

## (undated) DEVICE — SUTURE STRATAFIX SYMMETRIC PDS + SZ 1 L18IN ABSRB VLT OS-6 SXPP1A201

## (undated) DEVICE — 4-PORT MANIFOLD: Brand: NEPTUNE 2

## (undated) DEVICE — PILLOW POS W15XH6XL22IN RASPBERRY FOAM ABD W/ STRP DISP FOR

## (undated) DEVICE — Z INACTIVE USE 2660664 SOLUTION IRRIG 3000ML 0.9% SOD CHL USP UROMATIC PLAS CONT

## (undated) DEVICE — SPONGE LAP W18XL18IN WHT COT 4 PLY FLD STRUNG RADPQ DISP ST

## (undated) DEVICE — INSTRUMENT CURRETTES REUSABLE

## (undated) DEVICE — TRAY HIP EXTRAS REUSABLE

## (undated) DEVICE — GLOVE ORTHO 8   MSG9480

## (undated) DEVICE — HEWSON SUTURE RETRIEVER: Brand: HEWSON SUTURE RETRIEVER

## (undated) DEVICE — DRAPE,TOP,102X53,STERILE: Brand: MEDLINE

## (undated) DEVICE — SYRINGE MED 50ML LUERLOCK TIP

## (undated) DEVICE — GUN GLUE STRYKER

## (undated) DEVICE — GOWN,SIRUS,FABRNF,XL,20/CS: Brand: MEDLINE

## (undated) DEVICE — SOLUTION IRRIG 2000ML 0.9% SOD CHL USP UROMATIC PLAS CONT

## (undated) DEVICE — COVER HNDL LT DISP

## (undated) DEVICE — INTENDED FOR TISSUE SEPARATION, AND OTHER PROCEDURES THAT REQUIRE A SHARP SURGICAL BLADE TO PUNCTURE OR CUT.: Brand: BARD-PARKER ® STAINLESS STEEL BLADES

## (undated) DEVICE — WAX SURG 2.5GM HEMSTAT BNE BEESWAX PARAFFIN ISO PALMITATE

## (undated) DEVICE — NEEDLE SYR 18GA L1.5IN RED PLAS HUB S STL BLNT FILL W/O

## (undated) DEVICE — MARKER,SKIN,WI/RULER AND LABELS: Brand: MEDLINE

## (undated) DEVICE — TRAY LAMBOTS REUSABLE

## (undated) DEVICE — INSTRUMENT CLAMP TOWEL LARGE REUSABLE

## (undated) DEVICE — GLOVE ORANGE PI 8 1/2   MSG9085

## (undated) DEVICE — SOLUTION IV IRRIG WATER 1000ML POUR BRL 2F7114

## (undated) DEVICE — GOWN,SIRUS,POLYRNF,SETINSLV,XL,20/CS: Brand: MEDLINE